# Patient Record
Sex: MALE | Race: WHITE | NOT HISPANIC OR LATINO
[De-identification: names, ages, dates, MRNs, and addresses within clinical notes are randomized per-mention and may not be internally consistent; named-entity substitution may affect disease eponyms.]

---
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
## 2024-01-17 ENCOUNTER — TRANSCRIPTION ENCOUNTER (OUTPATIENT)
Age: 19
End: 2024-01-17

## 2024-01-17 RX ORDER — INSULIN LISPRO 100 [IU]/ML
100 INJECTION, SOLUTION INTRAVENOUS; SUBCUTANEOUS
Qty: 9 | Refills: 2 | Status: ACTIVE | COMMUNITY
Start: 2023-01-03 | End: 1900-01-01

## 2024-02-09 ENCOUNTER — NON-APPOINTMENT (OUTPATIENT)
Age: 19
End: 2024-02-09

## 2024-04-01 ENCOUNTER — APPOINTMENT (OUTPATIENT)
Dept: ENDOCRINOLOGY | Facility: CLINIC | Age: 19
End: 2024-04-01
Payer: COMMERCIAL

## 2024-04-01 PROCEDURE — 99214 OFFICE O/P EST MOD 30 MIN: CPT

## 2024-04-01 RX ORDER — BLOOD-GLUCOSE SENSOR
EACH MISCELLANEOUS
Qty: 4 | Refills: 11 | Status: ACTIVE | COMMUNITY
Start: 2023-03-17 | End: 1900-01-01

## 2024-04-01 NOTE — REASON FOR VISIT
[Follow - Up] : a follow-up visit [Home] : at home, [unfilled] , at the time of the visit. [Medical Office: ([facility])___] : at the medical office located in  [Verbal consent obtained from patient] : the patient, [unfilled]

## 2024-06-07 RX ORDER — INSULIN LISPRO 100 [IU]/ML
100 INJECTION, SOLUTION SUBCUTANEOUS
Qty: 45 | Refills: 3 | Status: ACTIVE | COMMUNITY
Start: 2022-06-16 | End: 1900-01-01

## 2024-06-10 ENCOUNTER — RX RENEWAL (OUTPATIENT)
Age: 19
End: 2024-06-10

## 2024-06-10 RX ORDER — INSULIN GLARGINE 100 [IU]/ML
100 INJECTION, SOLUTION SUBCUTANEOUS
Qty: 15 | Refills: 11 | Status: ACTIVE | COMMUNITY
Start: 2022-06-14 | End: 1900-01-01

## 2024-06-14 ENCOUNTER — APPOINTMENT (OUTPATIENT)
Dept: ENDOCRINOLOGY | Facility: CLINIC | Age: 19
End: 2024-06-14
Payer: COMMERCIAL

## 2024-06-14 DIAGNOSIS — E10.9 TYPE 1 DIABETES MELLITUS W/OUT COMPLICATIONS: ICD-10-CM

## 2024-06-14 PROCEDURE — 99214 OFFICE O/P EST MOD 30 MIN: CPT

## 2024-06-14 NOTE — REASON FOR VISIT
[Follow - Up] : a follow-up visit [Home] : at home, [unfilled] , at the time of the visit. [Medical Office: ([facility])___] : at the medical office located in  [Verbal consent obtained from patient] : the patient, [unfilled]

## 2024-06-14 NOTE — HISTORY OF PRESENT ILLNESS
[FreeTextEntry1] : felt lantus 6 qhs wasnt working for past 2 weeks humalog judy kwikpen   was having fatty meat but cholesterol high so lean meats now   humalog 1:14 >200 then takes extra 2-3 units  151-200 201-250 251-300  lamictal still   PLan: inc lantus to 7 then 8 if needed due to overnight highs cont icr 1:14, if post meal lows then icr 1:15  no random corrections, add 1:50 >150.

## 2024-06-16 NOTE — PHYSICAL EXAM
[Alert] : alert [No Acute Distress] : no acute distress [Normal Voice/Communication] : normal voice communication

## 2024-07-12 ENCOUNTER — APPOINTMENT (OUTPATIENT)
Dept: ENDOCRINOLOGY | Facility: CLINIC | Age: 19
End: 2024-07-12

## 2024-08-12 ENCOUNTER — APPOINTMENT (OUTPATIENT)
Dept: ENDOCRINOLOGY | Facility: CLINIC | Age: 19
End: 2024-08-12
Payer: COMMERCIAL

## 2024-08-12 DIAGNOSIS — E10.9 TYPE 1 DIABETES MELLITUS W/OUT COMPLICATIONS: ICD-10-CM

## 2024-08-12 DIAGNOSIS — R73.09 OTHER ABNORMAL GLUCOSE: ICD-10-CM

## 2024-08-12 DIAGNOSIS — G40.909 EPILEPSY, UNSPECIFIED, NOT INTRACTABLE, W/OUT STATUS EPILEPTICUS: ICD-10-CM

## 2024-08-12 PROCEDURE — 99215 OFFICE O/P EST HI 40 MIN: CPT

## 2024-11-27 ENCOUNTER — APPOINTMENT (OUTPATIENT)
Dept: ENDOCRINOLOGY | Facility: CLINIC | Age: 19
End: 2024-11-27

## 2024-11-27 DIAGNOSIS — E10.9 TYPE 1 DIABETES MELLITUS W/OUT COMPLICATIONS: ICD-10-CM

## 2024-12-20 ENCOUNTER — LABORATORY RESULT (OUTPATIENT)
Age: 19
End: 2024-12-20

## 2024-12-20 ENCOUNTER — RESULT CHARGE (OUTPATIENT)
Age: 19
End: 2024-12-20

## 2024-12-20 ENCOUNTER — APPOINTMENT (OUTPATIENT)
Dept: ENDOCRINOLOGY | Facility: CLINIC | Age: 19
End: 2024-12-20
Payer: COMMERCIAL

## 2024-12-20 VITALS
DIASTOLIC BLOOD PRESSURE: 70 MMHG | OXYGEN SATURATION: 98 % | WEIGHT: 156 LBS | HEIGHT: 71 IN | HEART RATE: 70 BPM | BODY MASS INDEX: 21.84 KG/M2 | SYSTOLIC BLOOD PRESSURE: 116 MMHG

## 2024-12-20 DIAGNOSIS — G40.909 EPILEPSY, UNSPECIFIED, NOT INTRACTABLE, W/OUT STATUS EPILEPTICUS: ICD-10-CM

## 2024-12-20 LAB — HBA1C MFR BLD HPLC: 7.8

## 2024-12-20 PROCEDURE — 99215 OFFICE O/P EST HI 40 MIN: CPT

## 2024-12-20 PROCEDURE — 95251 CONT GLUC MNTR ANALYSIS I&R: CPT

## 2024-12-21 ENCOUNTER — EMERGENCY (EMERGENCY)
Facility: HOSPITAL | Age: 19
LOS: 1 days | Discharge: HOME | End: 2024-12-21
Attending: STUDENT IN AN ORGANIZED HEALTH CARE EDUCATION/TRAINING PROGRAM
Payer: COMMERCIAL

## 2024-12-21 VITALS
RESPIRATION RATE: 18 BRPM | HEART RATE: 94 BPM | HEIGHT: 71 IN | WEIGHT: 158.07 LBS | OXYGEN SATURATION: 95 % | DIASTOLIC BLOOD PRESSURE: 88 MMHG | TEMPERATURE: 99 F | SYSTOLIC BLOOD PRESSURE: 132 MMHG

## 2024-12-21 VITALS
HEART RATE: 86 BPM | TEMPERATURE: 98 F | OXYGEN SATURATION: 97 % | DIASTOLIC BLOOD PRESSURE: 73 MMHG | RESPIRATION RATE: 18 BRPM | SYSTOLIC BLOOD PRESSURE: 134 MMHG

## 2024-12-21 DIAGNOSIS — E10.65 TYPE 1 DIABETES MELLITUS WITH HYPERGLYCEMIA: ICD-10-CM

## 2024-12-21 LAB
ALBUMIN SERPL ELPH-MCNC: 5.8 G/DL — HIGH (ref 3.3–5)
ALP SERPL-CCNC: 89 U/L — SIGNIFICANT CHANGE UP (ref 40–120)
ALT FLD-CCNC: 18 U/L — SIGNIFICANT CHANGE UP (ref 10–45)
ANION GAP SERPL CALC-SCNC: 18 MMOL/L — HIGH (ref 5–17)
APPEARANCE UR: CLEAR — SIGNIFICANT CHANGE UP
AST SERPL-CCNC: 19 U/L — SIGNIFICANT CHANGE UP (ref 10–40)
B-OH-BUTYR SERPL-SCNC: 0.4 MMOL/L — SIGNIFICANT CHANGE UP
BACTERIA # UR AUTO: NEGATIVE /HPF — SIGNIFICANT CHANGE UP
BASOPHILS # BLD AUTO: 0.03 K/UL — SIGNIFICANT CHANGE UP (ref 0–0.2)
BASOPHILS NFR BLD AUTO: 0.5 % — SIGNIFICANT CHANGE UP (ref 0–2)
BILIRUB SERPL-MCNC: 0.4 MG/DL — SIGNIFICANT CHANGE UP (ref 0.2–1.2)
BILIRUB UR-MCNC: NEGATIVE — SIGNIFICANT CHANGE UP
BUN SERPL-MCNC: 9 MG/DL — SIGNIFICANT CHANGE UP (ref 7–23)
CALCIUM SERPL-MCNC: 10.2 MG/DL — SIGNIFICANT CHANGE UP (ref 8.4–10.5)
CAST: 0 /LPF — SIGNIFICANT CHANGE UP (ref 0–4)
CHLORIDE SERPL-SCNC: 101 MMOL/L — SIGNIFICANT CHANGE UP (ref 96–108)
CO2 SERPL-SCNC: 21 MMOL/L — LOW (ref 22–31)
COLOR SPEC: YELLOW — SIGNIFICANT CHANGE UP
CREAT SERPL-MCNC: 0.81 MG/DL — SIGNIFICANT CHANGE UP (ref 0.5–1.3)
DIFF PNL FLD: NEGATIVE — SIGNIFICANT CHANGE UP
EGFR: 129 ML/MIN/1.73M2 — SIGNIFICANT CHANGE UP
EOSINOPHIL # BLD AUTO: 0 K/UL — SIGNIFICANT CHANGE UP (ref 0–0.5)
EOSINOPHIL NFR BLD AUTO: 0 % — SIGNIFICANT CHANGE UP (ref 0–6)
GAS PNL BLDV: SIGNIFICANT CHANGE UP
GLUCOSE SERPL-MCNC: 158 MG/DL — HIGH (ref 70–99)
GLUCOSE UR QL: 100 MG/DL
HCT VFR BLD CALC: 45.6 % — SIGNIFICANT CHANGE UP (ref 39–50)
HGB BLD-MCNC: 16 G/DL — SIGNIFICANT CHANGE UP (ref 13–17)
IMM GRANULOCYTES NFR BLD AUTO: 0.3 % — SIGNIFICANT CHANGE UP (ref 0–0.9)
KETONES UR-MCNC: NEGATIVE MG/DL — SIGNIFICANT CHANGE UP
LEUKOCYTE ESTERASE UR-ACNC: NEGATIVE — SIGNIFICANT CHANGE UP
LYMPHOCYTES # BLD AUTO: 0.83 K/UL — LOW (ref 1–3.3)
LYMPHOCYTES # BLD AUTO: 12.8 % — LOW (ref 13–44)
MCHC RBC-ENTMCNC: 30.2 PG — SIGNIFICANT CHANGE UP (ref 27–34)
MCHC RBC-ENTMCNC: 35.1 G/DL — SIGNIFICANT CHANGE UP (ref 32–36)
MCV RBC AUTO: 86.2 FL — SIGNIFICANT CHANGE UP (ref 80–100)
MONOCYTES # BLD AUTO: 0.46 K/UL — SIGNIFICANT CHANGE UP (ref 0–0.9)
MONOCYTES NFR BLD AUTO: 7.1 % — SIGNIFICANT CHANGE UP (ref 2–14)
NEUTROPHILS # BLD AUTO: 5.12 K/UL — SIGNIFICANT CHANGE UP (ref 1.8–7.4)
NEUTROPHILS NFR BLD AUTO: 79.3 % — HIGH (ref 43–77)
NITRITE UR-MCNC: NEGATIVE — SIGNIFICANT CHANGE UP
NRBC # BLD: 0 /100 WBCS — SIGNIFICANT CHANGE UP (ref 0–0)
PH UR: 6 — SIGNIFICANT CHANGE UP (ref 5–8)
PLATELET # BLD AUTO: 211 K/UL — SIGNIFICANT CHANGE UP (ref 150–400)
POTASSIUM SERPL-MCNC: 4.4 MMOL/L — SIGNIFICANT CHANGE UP (ref 3.5–5.3)
POTASSIUM SERPL-SCNC: 4.4 MMOL/L — SIGNIFICANT CHANGE UP (ref 3.5–5.3)
PROT SERPL-MCNC: 8 G/DL — SIGNIFICANT CHANGE UP (ref 6–8.3)
PROT UR-MCNC: NEGATIVE MG/DL — SIGNIFICANT CHANGE UP
RBC # BLD: 5.29 M/UL — SIGNIFICANT CHANGE UP (ref 4.2–5.8)
RBC # FLD: 11.9 % — SIGNIFICANT CHANGE UP (ref 10.3–14.5)
RBC CASTS # UR COMP ASSIST: 0 /HPF — SIGNIFICANT CHANGE UP (ref 0–4)
SALICYLATES SERPL-MCNC: <2 MG/DL — LOW (ref 15–30)
SODIUM SERPL-SCNC: 140 MMOL/L — SIGNIFICANT CHANGE UP (ref 135–145)
SP GR SPEC: 1.01 — SIGNIFICANT CHANGE UP (ref 1–1.03)
SQUAMOUS # UR AUTO: 0 /HPF — SIGNIFICANT CHANGE UP (ref 0–5)
UROBILINOGEN FLD QL: 0.2 MG/DL — SIGNIFICANT CHANGE UP (ref 0.2–1)
WBC # BLD: 6.46 K/UL — SIGNIFICANT CHANGE UP (ref 3.8–10.5)
WBC # FLD AUTO: 6.46 K/UL — SIGNIFICANT CHANGE UP (ref 3.8–10.5)
WBC UR QL: 0 /HPF — SIGNIFICANT CHANGE UP (ref 0–5)

## 2024-12-21 PROCEDURE — 84295 ASSAY OF SERUM SODIUM: CPT

## 2024-12-21 PROCEDURE — 36000 PLACE NEEDLE IN VEIN: CPT

## 2024-12-21 PROCEDURE — 82803 BLOOD GASES ANY COMBINATION: CPT

## 2024-12-21 PROCEDURE — 87086 URINE CULTURE/COLONY COUNT: CPT

## 2024-12-21 PROCEDURE — 83605 ASSAY OF LACTIC ACID: CPT

## 2024-12-21 PROCEDURE — 80307 DRUG TEST PRSMV CHEM ANLYZR: CPT

## 2024-12-21 PROCEDURE — 80175 DRUG SCREEN QUAN LAMOTRIGINE: CPT

## 2024-12-21 PROCEDURE — 85025 COMPLETE CBC W/AUTO DIFF WBC: CPT

## 2024-12-21 PROCEDURE — 82947 ASSAY GLUCOSE BLOOD QUANT: CPT

## 2024-12-21 PROCEDURE — 82435 ASSAY OF BLOOD CHLORIDE: CPT

## 2024-12-21 PROCEDURE — 83735 ASSAY OF MAGNESIUM: CPT

## 2024-12-21 PROCEDURE — 99284 EMERGENCY DEPT VISIT MOD MDM: CPT

## 2024-12-21 PROCEDURE — 70450 CT HEAD/BRAIN W/O DYE: CPT | Mod: 26,MC

## 2024-12-21 PROCEDURE — 85014 HEMATOCRIT: CPT

## 2024-12-21 PROCEDURE — 99284 EMERGENCY DEPT VISIT MOD MDM: CPT | Mod: 25

## 2024-12-21 PROCEDURE — 70450 CT HEAD/BRAIN W/O DYE: CPT | Mod: MC

## 2024-12-21 PROCEDURE — 82010 KETONE BODYS QUAN: CPT

## 2024-12-21 PROCEDURE — 85018 HEMOGLOBIN: CPT

## 2024-12-21 PROCEDURE — 84100 ASSAY OF PHOSPHORUS: CPT

## 2024-12-21 PROCEDURE — 82330 ASSAY OF CALCIUM: CPT

## 2024-12-21 PROCEDURE — 81001 URINALYSIS AUTO W/SCOPE: CPT

## 2024-12-21 PROCEDURE — 80053 COMPREHEN METABOLIC PANEL: CPT

## 2024-12-21 PROCEDURE — 84132 ASSAY OF SERUM POTASSIUM: CPT

## 2024-12-21 PROCEDURE — 82962 GLUCOSE BLOOD TEST: CPT

## 2024-12-21 RX ORDER — 0.9 % SODIUM CHLORIDE 0.9 %
1000 INTRAVENOUS SOLUTION INTRAVENOUS ONCE
Refills: 0 | Status: COMPLETED | OUTPATIENT
Start: 2024-12-21 | End: 2024-12-21

## 2024-12-21 RX ADMIN — Medication 1000 MILLILITER(S): at 16:19

## 2024-12-21 NOTE — ED PROVIDER NOTE - PHYSICAL EXAMINATION
GENERAL: no acute distress, non-toxic appearing  HEENT: normal conjunctiva, oral mucosa dry  CARDIAC: regular rate and regular rhythm  PULM: clear to ascultation bilaterally, no appreciable crackles, rales, rhonchi, or wheezing, no increased work of breathing  GI: abdomen nondistended, soft, nontender  : no CVA tenderness, no suprapubic tenderness

## 2024-12-21 NOTE — ED PROVIDER NOTE - PATIENT PORTAL LINK FT
You can access the FollowMyHealth Patient Portal offered by [facility] by registering at the following website: [email or web address]. By joining [employer]’s FollowMyHealth portal, you will also be able to view your health information using other applications (apps) compatible with our system.

## 2024-12-21 NOTE — ED ADULT NURSE NOTE - OBJECTIVE STATEMENT
Pt is a 18 y/o male with PMH epilepsy and T1DM presenting to the ED c/o intermittent abd pain with n/v, and 16 lb weight loss over the past 1-2 months. Pt reports pain, n/v not currently present. Pt states vomiting is non-bloody and typically occurs in the morning, abd pain when present is above the umbilicus. Pt reports he is able to tolerate PO intake on daily basis, but eating less due to "bad food at college." Pt was seen by endocrinologist with elevated anion gap yesterday. Pt states last seizure in Feb 2024. Pt denies polyuria, polydipsia, headaches, sob, chest pain.

## 2024-12-21 NOTE — ED PROVIDER NOTE - PROGRESS NOTE DETAILS
CT without evidence of intracranial pathology. No evidence of DKA at this time on blood work. Pt recently found to have normal thyroid functioning on outpatient labs. Will send pt home with GI follow up for potential gastritis. Pt is afebrile currently, vitals are stable. Pt was educated on outpatient treatment, follow up and return precautions. Shared decision making performed. Pt okay for DC home at this time. Questions answered. Pt understands and agrees with the plan for discharge home. Pt has access to appropriate follow up.   Patsy Smith PGY1

## 2024-12-21 NOTE — ED PROVIDER NOTE - CLINICAL SUMMARY MEDICAL DECISION MAKING FREE TEXT BOX
20 y/o M PMHx DM type 1 and epilepsy on lamotrigine presenting with concern for DKA. Pt was seen by endocrinologist on 12/20 and blood work found that he was acidotic and ketones in urine. Pt has been vomiting within the first 15 minutes of waking up for the past 1.5 - 2 months. Low suspicion for DKA given length of symptoms and BS. Concern for increased ICP/intracranial mass given vomiting first thing in the morning and recent weight loss. Will give fluids, obtain cbc, cmp, beta-hydroxybutrate, UA, vbg. Will obtain CT head. Dispo pending labs and imaging.

## 2024-12-21 NOTE — ED PROVIDER NOTE - ATTENDING CONTRIBUTION TO CARE
Kalani Soto (Rodriguez), DO  I have personally performed a face to face medical and diagnostic evaluation of the patient. I have discussed with and reviewed the resident's note and agree with the History, ROS, Physical Exam and MDM unless otherwise indicated. A brief summary of my personal evaluation and impression can be found below. I actively participated in the comanagement of this patient with the resident. I have personally reviewed all orders, study/imaging results, medication orders. I discussed indications for consultant evaluation and consultant recommendations with the resident when applicable, and have discussed the disposition plan with the resident.  19y male hx DM1 on lantus and humalog only presents for evaluation of outpatient elevated anion gap and ketoacids. pt has noted 16lb unintentional weight loss and vomiting in the mornings nearly every other day. he does not usually vomit throughout the day. no associated fever chills or night sweats. no urinary symptoms. states that the mornings he vomits, he usually has a meal late in the evening the night before. denies cp sob syncope. drinks alcohol on occasion, but not usually before the vomiting. no marijuana or other drug use. recently tested for thyroid dysfunction outpatient, which was normal. also being worked up outpatient for addisons disease. no travel or sick contacts. consider severe gastritis v pancreatitis. unlikely chronically in DKA. BGL only 207 on arrival and pt overall well appearing. normal BP no concerns for adrenal crisis. clinically with dry mucus membranes, clear lungs, benign abdomen. never had a CT or MRI for his epilepsy, so will rule out space occupying lesion in the setting or morning HA/vomiting. pt neuro intact. possible side effect of recently increased lamotrigine, will send a level. otherwise if labs and CT nonactionable pt appears well enough for outpatient follow up.

## 2024-12-21 NOTE — ED PROVIDER NOTE - NSFOLLOWUPINSTRUCTIONS_ED_ALL_ED_FT
Thank you for coming to the Emergency Department.    You were seen today for concern for diabetic ketoacidosis (DKA) and history of vomiting for the past couple of months. We obtained lab work and imaging to help determine what was causing your symptoms. You were not found to be in DKA at this time. You CT scan of your brain did not show any signs of intracranial masses or bleeding at this time.     Based on our examination we have determined that there is no immediate danger to your health at this time.    We would like you to follow up with your primary care doctor within 1 week.     PLEASE RETURN TO THE EMERGENCY DEPARTMENT and call 911 IF YOU EXPERIENCE ANY OF THE FOLLOWING SYMPTOMS: inability to tolerate food and drink, fever, abdominal pain, relentless vomiting or any other concerning symptoms.

## 2024-12-21 NOTE — ED PROVIDER NOTE - OBJECTIVE STATEMENT
20 y/o M PMHx DM type 1 and epilepsy on lamotrigine presenting with 1.5 - 18 y/o M PMHx DM type 1 and epilepsy on lamotrigine presenting with concern for DKA. Pt was seen by endocrinologist on 12/20 and blood work found that he was acidotic and ketones in urine. Pt has been vomiting within the first 15 minutes of waking up for the past 1.5 - 2 months. Pt denies polyuria, polydispsia, dysuria, abdominal pain, nausea, diarrhea, fever, recent falls/trauma, chest pain, recent illnesses, HA, changes in vision. Pt compliant with all medications. Pt endorses recent stress at school over the last 2 months but denies SI, HI, previous suicide attempts. pt on lexapro, with good support system. Pt endorsing recreational drinking and marijuana use every 2-3 weeks. Pt lost 16 lbs in the last 1.5 months

## 2024-12-22 LAB
CULTURE RESULTS: SIGNIFICANT CHANGE UP
SPECIMEN SOURCE: SIGNIFICANT CHANGE UP

## 2024-12-23 DIAGNOSIS — E10.9 TYPE 1 DIABETES MELLITUS W/OUT COMPLICATIONS: ICD-10-CM

## 2024-12-23 LAB
ANION GAP SERPL CALC-SCNC: 18 MMOL/L
BUN SERPL-MCNC: 14 MG/DL
C PEPTIDE SERPL-MCNC: 0.8 NG/ML
CALCIUM SERPL-MCNC: 10.2 MG/DL
CHLORIDE SERPL-SCNC: 100 MMOL/L
CO2 SERPL-SCNC: 21 MMOL/L
CREAT SERPL-MCNC: 0.83 MG/DL
EGFR: 128 ML/MIN/1.73M2
GLUCOSE SERPL-MCNC: 209 MG/DL
HCT VFR BLD CALC: 46.3 %
HGB BLD-MCNC: 16.4 G/DL
MCHC RBC-ENTMCNC: 30.4 PG
MCHC RBC-ENTMCNC: 35.4 G/DL
MCV RBC AUTO: 85.7 FL
PLATELET # BLD AUTO: 212 K/UL
POTASSIUM SERPL-SCNC: 4.7 MMOL/L
RBC # BLD: 5.4 M/UL
RBC # FLD: 12.5 %
SODIUM SERPL-SCNC: 139 MMOL/L
TSH SERPL-ACNC: 1.36 UIU/ML
WBC # FLD AUTO: 4.06 K/UL

## 2024-12-24 LAB — LAMOTRIGINE SERPL-MCNC: <1 UG/ML — LOW (ref 2–20)

## 2024-12-27 LAB — CELIACPAN: NORMAL

## 2024-12-28 LAB — GAD65 AB SER-MCNC: 0.19 NMOL/L

## 2025-04-18 ENCOUNTER — APPOINTMENT (OUTPATIENT)
Dept: ENDOCRINOLOGY | Facility: CLINIC | Age: 20
End: 2025-04-18

## 2025-04-18 ENCOUNTER — TRANSCRIPTION ENCOUNTER (OUTPATIENT)
Age: 20
End: 2025-04-18

## 2025-05-16 ENCOUNTER — APPOINTMENT (INPATIENT)
Dept: RADIOLOGY | Facility: HOSPITAL | Age: 20
DRG: 637 | End: 2025-05-16
Attending: HOSPITALIST
Payer: COMMERCIAL

## 2025-05-16 ENCOUNTER — APPOINTMENT (INPATIENT)
Dept: RADIOLOGY | Facility: HOSPITAL | Age: 20
DRG: 637 | End: 2025-05-16
Attending: SURGERY
Payer: COMMERCIAL

## 2025-05-16 ENCOUNTER — HOSPITAL ENCOUNTER (INPATIENT)
Facility: HOSPITAL | Age: 20
LOS: 3 days | Discharge: HOME | DRG: 637 | End: 2025-05-19
Admitting: HOSPITALIST
Payer: COMMERCIAL

## 2025-05-16 ENCOUNTER — APPOINTMENT (EMERGENCY)
Dept: RADIOLOGY | Facility: HOSPITAL | Age: 20
DRG: 637 | End: 2025-05-16
Payer: COMMERCIAL

## 2025-05-16 DIAGNOSIS — E10.10 DIABETIC KETOACIDOSIS WITHOUT COMA ASSOCIATED WITH TYPE 1 DIABETES MELLITUS (CMS/HCC): Primary | ICD-10-CM

## 2025-05-16 PROBLEM — D72.829 LEUKOCYTOSIS: Status: ACTIVE | Noted: 2025-05-16

## 2025-05-16 PROBLEM — J98.2 PNEUMOMEDIASTINUM (CMS/HCC): Status: ACTIVE | Noted: 2025-05-16

## 2025-05-16 PROBLEM — E87.20 METABOLIC ACIDOSIS: Status: ACTIVE | Noted: 2025-05-16

## 2025-05-16 PROBLEM — J69.0 ASPIRATION PNEUMONIA (CMS/HCC): Status: ACTIVE | Noted: 2025-05-16

## 2025-05-16 PROBLEM — E87.1 HYPONATREMIA: Status: ACTIVE | Noted: 2025-05-16

## 2025-05-16 LAB
ALBUMIN SERPL-MCNC: 5.1 G/DL (ref 3.5–5.7)
ALP SERPL-CCNC: 106 IU/L (ref 34–125)
ALT SERPL-CCNC: 19 IU/L (ref 7–52)
ANION GAP SERPL CALC-SCNC: 13 MEQ/L (ref 3–15)
ANION GAP SERPL CALC-SCNC: 14 MEQ/L (ref 3–15)
ANION GAP SERPL CALC-SCNC: 16 MEQ/L (ref 3–15)
ANION GAP SERPL CALC-SCNC: 22 MEQ/L (ref 3–15)
ANION GAP SERPL CALC-SCNC: 28 MEQ/L (ref 3–15)
AST SERPL-CCNC: 16 IU/L (ref 13–39)
ATRIAL RATE: 113
B-OH-BUTYR SERPL-SCNC: 8.58 MMOL/L
B-OH-BUTYR SERPL-SCNC: 8.94 MMOL/L
BASE EXCESS BLDV CALC-SCNC: -11.1 MEQ/L
BASE EXCESS BLDV CALC-SCNC: -14.2 MEQ/L
BASE EXCESS BLDV CALC-SCNC: -19.6 MEQ/L
BASE EXCESS BLDV CALC-SCNC: -5.2 MEQ/L
BASE EXCESS BLDV CALC-SCNC: -7.3 MEQ/L
BASOPHILS # BLD: 0 K/UL (ref 0.01–0.1)
BASOPHILS NFR BLD: 0 %
BILIRUB SERPL-MCNC: 0.5 MG/DL (ref 0.3–1.2)
BUN SERPL-MCNC: 10 MG/DL (ref 7–25)
BUN SERPL-MCNC: 6 MG/DL (ref 7–25)
BUN SERPL-MCNC: 6 MG/DL (ref 7–25)
BUN SERPL-MCNC: 7 MG/DL (ref 7–25)
BUN SERPL-MCNC: 9 MG/DL (ref 7–25)
CA-I BLD-SCNC: 1.26 MMOL/L (ref 1.15–1.27)
CA-I BLDV-SCNC: 1.27 MMOL/L (ref 1.15–1.27)
CA-I BLDV-SCNC: 1.29 MMOL/L (ref 1.15–1.27)
CA-I BLDV-SCNC: 1.29 MMOL/L (ref 1.15–1.27)
CA-I BLDV-SCNC: 1.3 MMOL/L (ref 1.15–1.27)
CALCIUM SERPL-MCNC: 10.8 MG/DL (ref 8.6–10.3)
CALCIUM SERPL-MCNC: 9.3 MG/DL (ref 8.6–10.3)
CALCIUM SERPL-MCNC: 9.6 MG/DL (ref 8.6–10.3)
CALCIUM SERPL-MCNC: 9.8 MG/DL (ref 8.6–10.3)
CALCIUM SERPL-MCNC: 9.9 MG/DL (ref 8.6–10.3)
CHLORIDE BLDV-SCNC: 100 MEQ/L (ref 98–109)
CHLORIDE BLDV-SCNC: 101 MEQ/L (ref 98–109)
CHLORIDE BLDV-SCNC: 101 MEQ/L (ref 98–109)
CHLORIDE BLDV-SCNC: 104 MEQ/L (ref 98–109)
CHLORIDE BLDV-SCNC: 97 MEQ/L (ref 98–109)
CHLORIDE SERPL-SCNC: 101 MEQ/L (ref 98–107)
CHLORIDE SERPL-SCNC: 103 MEQ/L (ref 98–107)
CHLORIDE SERPL-SCNC: 103 MEQ/L (ref 98–107)
CHLORIDE SERPL-SCNC: 105 MEQ/L (ref 98–107)
CHLORIDE SERPL-SCNC: 95 MEQ/L (ref 98–107)
CO2 BLDV-SCNC: 12 MEQ/L (ref 22–32)
CO2 BLDV-SCNC: 15 MEQ/L (ref 22–32)
CO2 BLDV-SCNC: 18 MEQ/L (ref 22–32)
CO2 BLDV-SCNC: 22 MEQ/L (ref 22–32)
CO2 BLDV-SCNC: 8.4 MEQ/L (ref 22–32)
CO2 SERPL-SCNC: 11 MEQ/L (ref 21–31)
CO2 SERPL-SCNC: 14 MEQ/L (ref 21–31)
CO2 SERPL-SCNC: 16 MEQ/L (ref 21–31)
CO2 SERPL-SCNC: 19 MEQ/L (ref 21–31)
CO2 SERPL-SCNC: 9 MEQ/L (ref 21–31)
COHGB MFR BLD: 1.3 %
CREAT SERPL-MCNC: 0.8 MG/DL (ref 0.7–1.3)
CREAT SERPL-MCNC: 0.9 MG/DL (ref 0.7–1.3)
CREAT SERPL-MCNC: 0.9 MG/DL (ref 0.7–1.3)
CREAT SERPL-MCNC: 1 MG/DL (ref 0.7–1.3)
CREAT SERPL-MCNC: 1.2 MG/DL (ref 0.7–1.3)
DIFFERENTIAL METHOD BLD: ABNORMAL
EGFRCR SERPLBLD CKD-EPI 2021: >60 ML/MIN/1.73M*2
EOSINOPHIL # BLD: 0.46 K/UL (ref 0.04–0.54)
EOSINOPHIL NFR BLD: 2 %
ERYTHROCYTE [DISTWIDTH] IN BLOOD BY AUTOMATED COUNT: 12 % (ref 11.6–14.4)
EST. AVERAGE GLUCOSE BLD GHB EST-MCNC: 192 MG/DL
FIO2 ON VENT: ABNORMAL %
GLUCOSE BLD-MCNC: 128 MG/DL (ref 70–99)
GLUCOSE BLD-MCNC: 142 MG/DL (ref 70–99)
GLUCOSE BLD-MCNC: 155 MG/DL (ref 70–99)
GLUCOSE BLD-MCNC: 160 MG/DL (ref 70–99)
GLUCOSE BLD-MCNC: 171 MG/DL (ref 70–99)
GLUCOSE BLD-MCNC: 177 MG/DL (ref 70–99)
GLUCOSE BLD-MCNC: 180 MG/DL (ref 70–99)
GLUCOSE BLD-MCNC: 180 MG/DL (ref 70–99)
GLUCOSE BLD-MCNC: 185 MG/DL (ref 70–99)
GLUCOSE BLD-MCNC: 192 MG/DL (ref 70–99)
GLUCOSE BLD-MCNC: 193 MG/DL (ref 70–99)
GLUCOSE BLD-MCNC: 195 MG/DL (ref 70–99)
GLUCOSE BLD-MCNC: 197 MG/DL (ref 70–99)
GLUCOSE BLD-MCNC: 198 MG/DL (ref 70–99)
GLUCOSE BLD-MCNC: 201 MG/DL (ref 70–99)
GLUCOSE BLD-MCNC: 201 MG/DL (ref 70–99)
GLUCOSE BLD-MCNC: 208 MG/DL (ref 70–99)
GLUCOSE BLD-MCNC: 222 MG/DL (ref 70–99)
GLUCOSE BLD-MCNC: 294 MG/DL (ref 70–99)
GLUCOSE BLD-MCNC: 301 MG/DL (ref 70–99)
GLUCOSE BLDV-MCNC: 159 MG/DL (ref 70–99)
GLUCOSE BLDV-MCNC: 195 MG/DL (ref 70–99)
GLUCOSE BLDV-MCNC: 223 MG/DL (ref 70–99)
GLUCOSE BLDV-MCNC: 226 MG/DL (ref 70–99)
GLUCOSE BLDV-MCNC: 386 MG/DL (ref 70–99)
GLUCOSE SERPL-MCNC: 156 MG/DL (ref 70–99)
GLUCOSE SERPL-MCNC: 180 MG/DL (ref 70–99)
GLUCOSE SERPL-MCNC: 217 MG/DL (ref 70–99)
GLUCOSE SERPL-MCNC: 218 MG/DL (ref 70–99)
GLUCOSE SERPL-MCNC: 326 MG/DL (ref 70–99)
HBA1C MFR BLD: 8.3 %
HCO3 BLDV-SCNC: 13 MEQ/L (ref 21–28)
HCO3 BLDV-SCNC: 16 MEQ/L (ref 21–28)
HCO3 BLDV-SCNC: 19 MEQ/L (ref 21–28)
HCO3 BLDV-SCNC: 19 MEQ/L (ref 21–28)
HCO3 BLDV-SCNC: 9 MEQ/L (ref 21–28)
HCT VFR BLD AUTO: 47.1 % (ref 40.1–51)
HGB BLD-MCNC: 16.4 G/DL (ref 13.7–17.5)
HGB BLDV-MCNC: 14.8 G/DL (ref 14–17.5)
HGB BLDV-MCNC: 15.5 G/DL (ref 14–17.5)
HGB BLDV-MCNC: 15.9 G/DL (ref 14–17.5)
HGB BLDV-MCNC: 16.5 G/DL (ref 14–17.5)
HGB BLDV-MCNC: 17.3 G/DL (ref 14–17.5)
INHALED O2 CONCENTRATION: ABNORMAL %
LACTATE BLDV-SCNC: 0.7 MMOL/L (ref 0.4–2)
LACTATE BLDV-SCNC: 0.7 MMOL/L (ref 0.4–2)
LACTATE BLDV-SCNC: 0.9 MMOL/L (ref 0.4–2)
LACTATE BLDV-SCNC: 1.3 MMOL/L (ref 0.4–2)
LACTATE SERPL-SCNC: 1.3 MMOL/L (ref 0.4–2)
LACTATE SERPL-SCNC: 2.4 MMOL/L (ref 0.4–2)
LACTATE SERPL-SCNC: 2.4 MMOL/L (ref 0.4–2)
LIPASE SERPL-CCNC: 7 U/L (ref 11–82)
LYMPHOCYTES # BLD: 1.61 K/UL (ref 1.2–3.5)
LYMPHOCYTES NFR BLD: 7 %
MAGNESIUM SERPL-MCNC: 1.8 MG/DL (ref 1.8–2.5)
MAGNESIUM SERPL-MCNC: 1.8 MG/DL (ref 1.8–2.5)
MAGNESIUM SERPL-MCNC: 2 MG/DL (ref 1.8–2.5)
MAGNESIUM SERPL-MCNC: 2.1 MG/DL (ref 1.8–2.5)
MAGNESIUM SERPL-MCNC: 2.4 MG/DL (ref 1.8–2.5)
MCH RBC QN AUTO: 30.3 PG (ref 28–33.2)
MCHC RBC AUTO-ENTMCNC: 34.8 G/DL (ref 32.2–36.5)
MCV RBC AUTO: 87.1 FL (ref 83–98)
METHGB BLD-SCNC: 0.9 % (ref 0.4–1.5)
MONOCYTES # BLD: 1.38 K/UL (ref 0.3–1)
MONOCYTES NFR BLD: 6 %
MRSA DNA SPEC QL NAA+PROBE: NOT DETECTED
NEUTS BAND # BLD: 19.58 K/UL (ref 1.7–7)
NEUTS SEG NFR BLD: 85 %
P AXIS: 73
PCO2 BLDV: 23 MM HG (ref 41–51)
PCO2 BLDV: 25 MM HG (ref 41–51)
PCO2 BLDV: 29 MM HG (ref 41–51)
PCO2 BLDV: 32 MM HG (ref 41–51)
PCO2 BLDV: 40 MM HG (ref 41–51)
PH BLDV: 7.13 [PH] (ref 7.32–7.42)
PH BLDV: 7.25 PH (ref 7.32–7.42)
PH BLDV: 7.29 PH (ref 7.32–7.42)
PH BLDV: 7.32 PH (ref 7.32–7.42)
PH BLDV: 7.34 PH (ref 7.32–7.42)
PHOSPHATE SERPL-MCNC: 3.1 MG/DL (ref 2.4–4.7)
PHOSPHATE SERPL-MCNC: 3.2 MG/DL (ref 2.4–4.7)
PHOSPHATE SERPL-MCNC: 3.3 MG/DL (ref 2.4–4.7)
PHOSPHATE SERPL-MCNC: 3.6 MG/DL (ref 2.4–4.7)
PLATELET # BLD AUTO: 365 K/UL (ref 150–350)
PLATELET # BLD EST: ABNORMAL 10*3/UL
PLATELET CLUMP BLD QL SMEAR: PRESENT
PMV BLD AUTO: 11.5 FL (ref 9.4–12.4)
PO2 BLDV: 27 MM HG (ref 25–40)
PO2 BLDV: 41 MM HG (ref 25–40)
PO2 BLDV: 47 MM HG (ref 25–40)
PO2 BLDV: 77 MM HG (ref 25–40)
PO2 BLDV: 78 MM HG (ref 25–40)
POCT PATIENT TEMPERATURE: 98.6 °F (ref 97–99)
POCT TEST (BLD GAS): ABNORMAL
POCT TEST: ABNORMAL
POTASSIUM BLDV-SCNC: 4.5 MEQ/L (ref 3.4–4.5)
POTASSIUM BLDV-SCNC: 4.6 MEQ/L (ref 3.4–4.5)
POTASSIUM BLDV-SCNC: 4.6 MEQ/L (ref 3.4–4.5)
POTASSIUM BLDV-SCNC: 5.2 MEQ/L (ref 3.4–4.5)
POTASSIUM BLDV-SCNC: 5.3 MEQ/L (ref 3.4–4.5)
POTASSIUM SERPL-SCNC: 4 MEQ/L (ref 3.5–5.1)
POTASSIUM SERPL-SCNC: 4.3 MEQ/L (ref 3.5–5.1)
POTASSIUM SERPL-SCNC: 4.6 MEQ/L (ref 3.5–5.1)
POTASSIUM SERPL-SCNC: 5 MEQ/L (ref 3.5–5.1)
POTASSIUM SERPL-SCNC: 5 MEQ/L (ref 3.5–5.1)
PR INTERVAL: 146
PROT SERPL-MCNC: 9.4 G/DL (ref 6–8.2)
QRS DURATION: 104
QT INTERVAL: 338
QTC CALCULATION(BAZETT): 463
R AXIS: 47
RBC # BLD AUTO: 5.41 M/UL (ref 4.5–5.8)
RBC MORPH BLD: NORMAL
SAO2 % BLDV: 47 % (ref 30–60)
SAO2 % BLDV: 73 % (ref 30–60)
SAO2 % BLDV: 80 % (ref 30–60)
SAO2 % BLDV: 95 % (ref 30–60)
SAO2 % BLDV: 96 % (ref 30–60)
SODIUM BLDV-SCNC: 127 MEQ/L (ref 136–145)
SODIUM BLDV-SCNC: 130 MEQ/L (ref 136–145)
SODIUM BLDV-SCNC: 130 MEQ/L (ref 136–145)
SODIUM BLDV-SCNC: 132 MEQ/L (ref 136–145)
SODIUM BLDV-SCNC: 133 MEQ/L (ref 136–145)
SODIUM SERPL-SCNC: 132 MEQ/L (ref 136–145)
SODIUM SERPL-SCNC: 133 MEQ/L (ref 136–145)
SODIUM SERPL-SCNC: 134 MEQ/L (ref 136–145)
SODIUM SERPL-SCNC: 135 MEQ/L (ref 136–145)
SODIUM SERPL-SCNC: 135 MEQ/L (ref 136–145)
T WAVE AXIS: 40
VENTRICULAR RATE: 113
WBC # BLD AUTO: 23.04 K/UL (ref 3.8–10.5)

## 2025-05-16 PROCEDURE — 36415 COLL VENOUS BLD VENIPUNCTURE: CPT

## 2025-05-16 PROCEDURE — 85025 COMPLETE CBC W/AUTO DIFF WBC: CPT

## 2025-05-16 PROCEDURE — 99222 1ST HOSP IP/OBS MODERATE 55: CPT | Performed by: SURGERY

## 2025-05-16 PROCEDURE — 20000000 HC ROOM AND CARE ICU

## 2025-05-16 PROCEDURE — 25000000 HC PHARMACY GENERAL

## 2025-05-16 PROCEDURE — 74176 CT ABD & PELVIS W/O CONTRAST: CPT

## 2025-05-16 PROCEDURE — 99291 CRITICAL CARE FIRST HOUR: CPT | Performed by: HOSPITALIST

## 2025-05-16 PROCEDURE — 83735 ASSAY OF MAGNESIUM: CPT

## 2025-05-16 PROCEDURE — 71045 X-RAY EXAM CHEST 1 VIEW: CPT

## 2025-05-16 PROCEDURE — 87641 MR-STAPH DNA AMP PROBE: CPT | Performed by: INTERNAL MEDICINE

## 2025-05-16 PROCEDURE — 63600000 HC DRUGS/DETAIL CODE: Mod: JZ

## 2025-05-16 PROCEDURE — 84100 ASSAY OF PHOSPHORUS: CPT

## 2025-05-16 PROCEDURE — 96374 THER/PROPH/DIAG INJ IV PUSH: CPT

## 2025-05-16 PROCEDURE — 93005 ELECTROCARDIOGRAM TRACING: CPT

## 2025-05-16 PROCEDURE — 80053 COMPREHEN METABOLIC PANEL: CPT

## 2025-05-16 PROCEDURE — 83605 ASSAY OF LACTIC ACID: CPT

## 2025-05-16 PROCEDURE — 80048 BASIC METABOLIC PNL TOTAL CA: CPT

## 2025-05-16 PROCEDURE — 99285 EMERGENCY DEPT VISIT HI MDM: CPT | Mod: 25

## 2025-05-16 PROCEDURE — 82010 KETONE BODYS QUAN: CPT

## 2025-05-16 PROCEDURE — 25800000 HC PHARMACY IV SOLUTIONS: Performed by: HOSPITALIST

## 2025-05-16 PROCEDURE — 25800000 HC PHARMACY IV SOLUTIONS

## 2025-05-16 PROCEDURE — 63600000 HC DRUGS/DETAIL CODE

## 2025-05-16 PROCEDURE — 63600105 HC IODINE BASED CONTRAST: Mod: JZ | Performed by: SURGERY

## 2025-05-16 PROCEDURE — 74220 X-RAY XM ESOPHAGUS 1CNTRST: CPT

## 2025-05-16 PROCEDURE — 83690 ASSAY OF LIPASE: CPT

## 2025-05-16 PROCEDURE — 83036 HEMOGLOBIN GLYCOSYLATED A1C: CPT

## 2025-05-16 RX ORDER — DEXTROSE 50 % IN WATER (D50W) INTRAVENOUS SYRINGE
25 AS NEEDED
Status: DISCONTINUED | OUTPATIENT
Start: 2025-05-16 | End: 2025-05-17

## 2025-05-16 RX ORDER — FLUCONAZOLE 2 MG/ML
200 INJECTION, SOLUTION INTRAVENOUS
Status: DISCONTINUED | OUTPATIENT
Start: 2025-05-16 | End: 2025-05-19 | Stop reason: HOSPADM

## 2025-05-16 RX ORDER — ONDANSETRON HYDROCHLORIDE 2 MG/ML
4 INJECTION, SOLUTION INTRAVENOUS EVERY 6 HOURS PRN
Status: DISCONTINUED | OUTPATIENT
Start: 2025-05-16 | End: 2025-05-19 | Stop reason: HOSPADM

## 2025-05-16 RX ORDER — METRONIDAZOLE 500 MG/100ML
500 INJECTION, SOLUTION INTRAVENOUS
Status: DISCONTINUED | OUTPATIENT
Start: 2025-05-16 | End: 2025-05-19 | Stop reason: HOSPADM

## 2025-05-16 RX ORDER — IOPAMIDOL 755 MG/ML
100 INJECTION, SOLUTION INTRAVASCULAR
Status: COMPLETED | OUTPATIENT
Start: 2025-05-16 | End: 2025-05-16

## 2025-05-16 RX ORDER — ADHESIVE BANDAGE 7/8"
15-30 BANDAGE TOPICAL AS NEEDED
Status: DISCONTINUED | OUTPATIENT
Start: 2025-05-16 | End: 2025-05-16 | Stop reason: SDUPTHER

## 2025-05-16 RX ORDER — ONDANSETRON HYDROCHLORIDE 2 MG/ML
4 INJECTION, SOLUTION INTRAVENOUS ONCE
Status: COMPLETED | OUTPATIENT
Start: 2025-05-16 | End: 2025-05-16

## 2025-05-16 RX ORDER — LAMOTRIGINE 100 MG/1
100 TABLET ORAL 2 TIMES DAILY
COMMUNITY

## 2025-05-16 RX ORDER — SODIUM CHLORIDE 9 MG/ML
INJECTION, SOLUTION INTRAVENOUS CONTINUOUS
Status: DISCONTINUED | OUTPATIENT
Start: 2025-05-16 | End: 2025-05-16

## 2025-05-16 RX ORDER — DEXTROSE 40 %
15-30 GEL (GRAM) ORAL AS NEEDED
Status: DISCONTINUED | OUTPATIENT
Start: 2025-05-16 | End: 2025-05-16 | Stop reason: SDUPTHER

## 2025-05-16 RX ORDER — ACETAMINOPHEN 10 MG/ML
1000 INJECTION, SOLUTION INTRAVENOUS EVERY 6 HOURS PRN
Status: DISPENSED | OUTPATIENT
Start: 2025-05-16 | End: 2025-05-17

## 2025-05-16 RX ORDER — DEXTROSE 40 %
15-30 GEL (GRAM) ORAL AS NEEDED
Status: DISCONTINUED | OUTPATIENT
Start: 2025-05-16 | End: 2025-05-17

## 2025-05-16 RX ORDER — INSULIN LISPRO 100 [IU]/ML
8-12 INJECTION, SOLUTION INTRAVENOUS; SUBCUTANEOUS
Status: ON HOLD | COMMUNITY
End: 2025-05-19 | Stop reason: ENTERED-IN-ERROR

## 2025-05-16 RX ORDER — DEXTROSE 50 % IN WATER (D50W) INTRAVENOUS SYRINGE
25 AS NEEDED
Status: DISCONTINUED | OUTPATIENT
Start: 2025-05-16 | End: 2025-05-16 | Stop reason: SDUPTHER

## 2025-05-16 RX ORDER — INSULIN LISPRO 100 [IU]/ML
3-5 INJECTION, SOLUTION INTRAVENOUS; SUBCUTANEOUS
Status: DISCONTINUED | OUTPATIENT
Start: 2025-05-16 | End: 2025-05-17

## 2025-05-16 RX ORDER — MORPHINE SULFATE 2 MG/ML
INJECTION, SOLUTION INTRAMUSCULAR; INTRAVENOUS
Status: DISPENSED
Start: 2025-05-16 | End: 2025-05-16

## 2025-05-16 RX ORDER — DEXTROSE MONOHYDRATE AND SODIUM CHLORIDE 5; .45 G/100ML; G/100ML
INJECTION, SOLUTION INTRAVENOUS CONTINUOUS
Status: ACTIVE | OUTPATIENT
Start: 2025-05-16 | End: 2025-05-17

## 2025-05-16 RX ORDER — ENOXAPARIN SODIUM 100 MG/ML
40 INJECTION SUBCUTANEOUS
Status: DISCONTINUED | OUTPATIENT
Start: 2025-05-16 | End: 2025-05-19 | Stop reason: HOSPADM

## 2025-05-16 RX ORDER — ADHESIVE BANDAGE 7/8"
15-30 BANDAGE TOPICAL AS NEEDED
Status: DISCONTINUED | OUTPATIENT
Start: 2025-05-16 | End: 2025-05-17

## 2025-05-16 RX ORDER — MORPHINE SULFATE 2 MG/ML
2 INJECTION, SOLUTION INTRAMUSCULAR; INTRAVENOUS ONCE
Status: COMPLETED | OUTPATIENT
Start: 2025-05-16 | End: 2025-05-16

## 2025-05-16 RX ADMIN — ENOXAPARIN SODIUM 40 MG: 40 INJECTION SUBCUTANEOUS at 18:06

## 2025-05-16 RX ADMIN — ACETAMINOPHEN 1000 MG: 10 INJECTION, SOLUTION INTRAVENOUS at 21:04

## 2025-05-16 RX ADMIN — MAGNESIUM SULFATE HEPTAHYDRATE 2 G: 40 INJECTION, SOLUTION INTRAVENOUS at 15:04

## 2025-05-16 RX ADMIN — ONDANSETRON 4 MG: 2 INJECTION INTRAMUSCULAR; INTRAVENOUS at 04:55

## 2025-05-16 RX ADMIN — METRONIDAZOLE 500 MG: 5 INJECTION, SOLUTION INTRAVENOUS at 19:55

## 2025-05-16 RX ADMIN — SODIUM CHLORIDE 1000 ML: 9 INJECTION, SOLUTION INTRAVENOUS at 04:55

## 2025-05-16 RX ADMIN — FLUCONAZOLE 200 MG: 2 INJECTION, SOLUTION INTRAVENOUS at 12:37

## 2025-05-16 RX ADMIN — DEXTROSE AND SODIUM CHLORIDE: 5; 450 INJECTION, SOLUTION INTRAVENOUS at 21:56

## 2025-05-16 RX ADMIN — INSULIN HUMAN 5 UNITS/HR: 1 INJECTION, SOLUTION INTRAVENOUS at 12:00

## 2025-05-16 RX ADMIN — MORPHINE SULFATE 2 MG: 2 INJECTION, SOLUTION INTRAMUSCULAR; INTRAVENOUS at 10:47

## 2025-05-16 RX ADMIN — ACETAMINOPHEN 1000 MG: 10 INJECTION, SOLUTION INTRAVENOUS at 11:55

## 2025-05-16 RX ADMIN — SODIUM CHLORIDE: 9 INJECTION, SOLUTION INTRAVENOUS at 06:18

## 2025-05-16 RX ADMIN — INSULIN HUMAN 4 UNITS/HR: 1 INJECTION, SOLUTION INTRAVENOUS at 12:59

## 2025-05-16 RX ADMIN — INSULIN HUMAN 2 UNITS/HR: 1 INJECTION, SOLUTION INTRAVENOUS at 15:55

## 2025-05-16 RX ADMIN — INSULIN HUMAN 3.5 UNITS/HR: 1 INJECTION, SOLUTION INTRAVENOUS at 06:26

## 2025-05-16 RX ADMIN — INSULIN HUMAN 1.5 UNITS/HR: 1 INJECTION, SOLUTION INTRAVENOUS at 07:45

## 2025-05-16 RX ADMIN — IOPAMIDOL 100 ML: 755 INJECTION, SOLUTION INTRAVENOUS at 13:44

## 2025-05-16 RX ADMIN — DEXTROSE AND SODIUM CHLORIDE: 5; 450 INJECTION, SOLUTION INTRAVENOUS at 15:10

## 2025-05-16 RX ADMIN — CEFTRIAXONE SODIUM 1 G: 1 INJECTION, POWDER, FOR SOLUTION INTRAMUSCULAR; INTRAVENOUS at 12:12

## 2025-05-16 RX ADMIN — METRONIDAZOLE 500 MG: 5 INJECTION, SOLUTION INTRAVENOUS at 12:59

## 2025-05-16 RX ADMIN — DEXTROSE AND SODIUM CHLORIDE: 5; 450 INJECTION, SOLUTION INTRAVENOUS at 09:20

## 2025-05-16 RX ADMIN — INSULIN HUMAN 1.5 UNITS/HR: 1 INJECTION, SOLUTION INTRAVENOUS at 18:01

## 2025-05-16 ASSESSMENT — ENCOUNTER SYMPTOMS
HEMATURIA: 0
DIARRHEA: 0
PALPITATIONS: 0
COUGH: 0
LIGHT-HEADEDNESS: 0
HALLUCINATIONS: 0
DIZZINESS: 0
VOMITING: 1
SHORTNESS OF BREATH: 0
ABDOMINAL PAIN: 1
NAUSEA: 1
CHILLS: 1
DYSURIA: 0
WEAKNESS: 1
COLOR CHANGE: 0
CONSTIPATION: 0
FATIGUE: 1
HEADACHES: 0
FEVER: 0

## 2025-05-16 ASSESSMENT — COGNITIVE AND FUNCTIONAL STATUS - GENERAL
CLIMB 3 TO 5 STEPS WITH RAILING: 4 - NONE
STANDING UP FROM CHAIR USING ARMS: 4 - NONE
MOVING TO AND FROM BED TO CHAIR: 4 - NONE
WALKING IN HOSPITAL ROOM: 4 - NONE

## 2025-05-16 NOTE — PLAN OF CARE
Problem: Adult Inpatient Plan of Care  Goal: Plan of Care Review  Outcome: Progressing  Goal: Patient-Specific Goal (Individualized)  Outcome: Progressing  Goal: Absence of Hospital-Acquired Illness or Injury  Outcome: Progressing  Goal: Optimal Comfort and Wellbeing  Outcome: Progressing  Goal: Readiness for Transition of Care  Outcome: Progressing    Patient oriented to room , call bell and visiting hours. Patient aware of hourly blood sugar checks and routine, serial lab work. Dad at bedside.

## 2025-05-16 NOTE — ASSESSMENT & PLAN NOTE
To prevent mediastinitis continue with antibiotics as above  The patient is also on fluconazole which is typically not necessary unless the surgical team specifically requested it

## 2025-05-16 NOTE — ASSESSMENT & PLAN NOTE
Pt with hyperglycemia, anion gap metabolic acidosis, elevated beta hydroxybutyrate, likely DKA     - insulin gtt, Q1 hr accu checks while on gtt  - frequent BMPs  - aggressive IVF resuscitation  - endocrine consult  - NPO for now  - appropriate DVT ppx

## 2025-05-16 NOTE — PROGRESS NOTES
Critical Care Progress Note      INTERVAL SUMMARY    Admitted overnight with DKA  On insulin gtt, NPO  CT abd/pelvis shows evidence of pneumomediastinum concerning for esophageal tear  Patient does not feel well. Denies nausea but admits to severe abdominal pain and chest pain. Will reach out to thoracic surgery for input     OBJECTIVE   VITAL SIGNS  Temp:  [36.6 °C (97.8 °F)-36.8 °C (98.3 °F)] 36.8 °C (98.3 °F)  Heart Rate:  [] 97  Resp:  [19-31] 28  BP: (104-138)/(58-98) 104/58       Intake/Output Summary (Last 24 hours) at 5/16/2025 1137  Last data filed at 5/16/2025 1052  Gross per 24 hour   Intake 1562.6 ml   Output 225 ml   Net 1337.6 ml       O2 Requirement:  Oxygen Therapy: None (Room air)                MEDICATIONS  cefTRIAXone, 1 g, q24h INT  fluconazole, 200 mg, q24h INT  [Provider Managed Hold] insulin lispro U-100, 3-5 Units, With meals & nightly  metroNIDAZOLE, 500 mg, q8h INT           LAB RESULTS  Recent Results (from the past 24 hours)   POCT Glucose    Collection Time: 05/16/25  4:23 AM   Result Value Ref Range    POCT Bedside Glucose 301 (H) 70 - 99 mg/dL    POC Test POC    CBC and differential    Collection Time: 05/16/25  4:32 AM   Result Value Ref Range    WBC 23.04 (H) 3.80 - 10.50 K/uL    RBC 5.41 4.50 - 5.80 M/uL    Hemoglobin 16.4 13.7 - 17.5 g/dL    Hematocrit 47.1 40.1 - 51.0 %    MCV 87.1 83.0 - 98.0 fL    MCH 30.3 28.0 - 33.2 pg    MCHC 34.8 32.2 - 36.5 g/dL    RDW 12.0 11.6 - 14.4 %    Platelets 365 (H) 150 - 350 K/uL    MPV 11.5 9.4 - 12.4 fL    Differential Type Manu     Neutrophils 85 %    Lymphocytes 7 %    Monocytes 6 %    Eosinophils 2 %    Basophils 0 %    Neutrophils, Absolute 19.58 (H) 1.70 - 7.00 K/uL    Lymphocytes, Absolute 1.61 1.20 - 3.50 K/uL    Monocytes, Absolute 1.38 (H) 0.30 - 1.00 K/uL    Eosinophils, Absolute 0.46 0.04 - 0.54 K/uL    Basophils, Absolute 0.00 (L) 0.01 - 0.10 K/uL    RBC Morphology Normal     Platelet Estimate Adequate (150,000-400,000)      Clumped Platelets Present    Comprehensive metabolic panel    Collection Time: 05/16/25  4:32 AM   Result Value Ref Range    Sodium 132 (L) 136 - 145 mEQ/L    Potassium 5.0 3.5 - 5.1 mEQ/L    Chloride 95 (L) 98 - 107 mEQ/L    CO2 9 (LL) 21 - 31 mEQ/L    BUN 10 7 - 25 mg/dL    Creatinine 1.2 0.7 - 1.3 mg/dL    Glucose 326 (H) 70 - 99 mg/dL    Calcium 10.8 (H) 8.6 - 10.3 mg/dL    AST (SGOT) 16 13 - 39 IU/L    ALT (SGPT) 19 7 - 52 IU/L    Alkaline Phosphatase 106 34 - 125 IU/L    Total Protein 9.4 (H) 6.0 - 8.2 g/dL    Albumin 5.1 3.5 - 5.7 g/dL    Bilirubin, Total 0.5 0.3 - 1.2 mg/dL    eGFR >60.0 >=60.0 mL/min/1.73m*2    Anion Gap 28 (H) 3 - 15 mEQ/L   Magnesium    Collection Time: 05/16/25  4:32 AM   Result Value Ref Range    Magnesium 1.8 1.8 - 2.5 mg/dL   Beta Hydroxybutyrate    Collection Time: 05/16/25  4:32 AM   Result Value Ref Range    Beta-Hydroxybutyrate 8.94 (H) <=0.28 mmol/L   Lipase    Collection Time: 05/16/25  4:32 AM   Result Value Ref Range    Lipase 7 (L) 11 - 82 U/L   Hemoglobin A1c    Collection Time: 05/16/25  4:32 AM   Result Value Ref Range    Hemoglobin A1C 8.3 (H) <5.7 %    Estimated Ave Glucose 192 mg/dL   VBG Comprehensive    Collection Time: 05/16/25  4:52 AM   Result Value Ref Range    pH, Venous 7.13 (LL) 7.32 - 7.42    pCO2, Venous 23 (LL) 41 - 51 mm Hg    pO2, Venous 47 (H) 25 - 40 mm Hg    HCO3, Venous 9.0 (L) 21.0 - 28.0 mEQ/L    Base Excess, Venous -19.6 mEQ/L    Oxygen Saturation, Venous 80 (H) 30 - 60 %    TCO2, Venous 8.4 (LL) 22.0 - 32.0 mEQ/L    Lactate 2.4 (H) 0.4 - 2.0 mmol/L    Glucose, Venous 386 (H) 70 - 99 mg/dL    Sodium Venous 127 (L) 136 - 145 mEQ/L    Potassium, Venous 5.3 (H) 3.4 - 4.5 mEQ/L    Chloride, Venous 97 (L) 98 - 109 mEQ/L    Ionized Calcium, Venous 1.26 1.15 - 1.27 mmol/L    Hemoglobin, Venous 17.3 14.0 - 17.5 g/dL    Carboxyhemoglobin 1.3 0.0 - 3.0; (Smokers: 0.0 - 9.0) %    Methemoglobin 0.9 0.4 - 1.5 %    Source Of Oxygen RA     FIO2 RA    Lactate, w/  reflex repeat if > 2.0    Collection Time: 05/16/25  4:52 AM   Result Value Ref Range    Lactate 2.4 (H) 0.4 - 2.0 mmol/L   ECG 12 lead    Collection Time: 05/16/25  5:06 AM   Result Value Ref Range    Ventricular rate 113     Atrial rate 113     CA Interval 146     QRS duration 104     QT Interval 338     QTC Calculation(Bazett) 463     P Axis 73     R Axis 47     T Wave Axis 40    Beta Hydroxybutyrate    Collection Time: 05/16/25  6:18 AM   Result Value Ref Range    Beta-Hydroxybutyrate 8.58 (H) <=0.28 mmol/L   POCT Glucose    Collection Time: 05/16/25  6:23 AM   Result Value Ref Range    POCT Bedside Glucose 294 (H) 70 - 99 mg/dL    POC Test POC    POCT Glucose    Collection Time: 05/16/25  7:43 AM   Result Value Ref Range    POCT Bedside Glucose 193 (H) 70 - 99 mg/dL    POC Test POC    POCT Glucose    Collection Time: 05/16/25  8:33 AM   Result Value Ref Range    POCT Bedside Glucose 171 (H) 70 - 99 mg/dL    POC Test POC    Lactic Acid (Repeat)    Collection Time: 05/16/25  8:47 AM   Result Value Ref Range    Lactate 1.3 0.4 - 2.0 mmol/L   Basic metabolic panel    Collection Time: 05/16/25  8:47 AM   Result Value Ref Range    Sodium 134 (L) 136 - 145 mEQ/L    Potassium 5.0 3.5 - 5.1 mEQ/L    Chloride 101 98 - 107 mEQ/L    CO2 11 (L) 21 - 31 mEQ/L    BUN 9 7 - 25 mg/dL    Creatinine 1.0 0.7 - 1.3 mg/dL    Glucose 180 (H) 70 - 99 mg/dL    Calcium 9.8 8.6 - 10.3 mg/dL    eGFR >60.0 >=60.0 mL/min/1.73m*2    Anion Gap 22 (H) 3 - 15 mEQ/L   Phosphorus    Collection Time: 05/16/25  8:47 AM   Result Value Ref Range    Phosphorus 3.6 2.4 - 4.7 mg/dL   Magnesium    Collection Time: 05/16/25  8:47 AM   Result Value Ref Range    Magnesium 2.0 1.8 - 2.5 mg/dL   POCT Venous Blood Gas    Collection Time: 05/16/25  8:53 AM   Result Value Ref Range    pH, Venous 7.25 (L) 7.32 - 7.42 pH    pCO2, Venous 25 (LL) 41 - 51 mm Hg    pO2, Venous 41 (H) 25 - 40 mm Hg    HCO3, Venous 13 (L) 21 - 28 mEQ/L    Base Excess, Venous -14.2 mEQ/L     Oxygen Saturation, Venous 73 (H) 30 - 60 %    TCO2, Venous 12 (L) 22 - 32 mEQ/L    Lactate, Venous 1.3 0.4 - 2.0 mmol/L    Glucose, Venous 195 (H) 70 - 99 mg/dL    Sodium Venous 130 (L) 136 - 145 mEQ/L    Potassium, Venous 5.2 (H) 3.4 - 4.5 mEQ/L    Chloride, Venous 101 98 - 109 mEQ/L    Ionized Calicum, Venous 1.29 (H) 1.15 - 1.27 mmol/L    Hemoglobin, Venous 16.5 14.0 - 17.5 g/dL    Patient Temperature 98.6 97.0 - 99.0 °F    POC Test POC    POCT Glucose    Collection Time: 05/16/25  9:20 AM   Result Value Ref Range    POCT Bedside Glucose 185 (H) 70 - 99 mg/dL    POC Test POC    POCT Glucose    Collection Time: 05/16/25 10:00 AM   Result Value Ref Range    POCT Bedside Glucose 195 (H) 70 - 99 mg/dL    POC Test POC    POCT Glucose    Collection Time: 05/16/25 10:52 AM   Result Value Ref Range    POCT Bedside Glucose 222 (H) 70 - 99 mg/dL    POC Test POC      Laboratory results were independently reviewed    ABG  Results from last 7 days   Lab Units 05/16/25  0452   SOURCE OF OXYGEN  RA         CULTURES  Microbiology Results       ** No results found for the last 720 hours. **            Laboratory results were independently reviewed    RADIOLOGY  X-RAY CHEST 1 VIEW  Result Date: 5/16/2025  CLINICAL HISTORY: Altered mental status COMPARISON: None COMMENT: TECHNIQUE: Single frontal view of the chest was performed. LUNGS AND PLEURA: There is subtle airspace opacity noted in the left lower lobe and retrocardiac region that could represent atelectasis and/or infiltrate. Remaining lung fields are clear. No pleural effusion. No pneumothorax. CARDIOMEDIASTINUM: Cardiomediastinal silhouette appears unchanged. SKELETON/OTHER: No acute osseous abnormality.     IMPRESSION: There is airspace opacity in the retrocardiac and left lower lobe region that could represent atelectasis and/or infiltrate. Follow-up chest radiograph is recommended to document complete resolution.     CT ABDOMEN PELVIS WITHOUT IV CONTRAST  Result Date:  5/16/2025  CLINICAL HISTORY:   Abdominal pain. COMPARISON:    None COMMENT: Technique: CT of the Abdomen and Pelvis was performed without IV contrast. The lack of IV contrast limits the detection of certain disease processes, particularly infection and malignancy. Oral contrast was not administered. CT DOSE:  One or more dose reduction techniques (e.g. automated exposure control, adjustment of the mA and/or kV according to patient size, use of iterative reconstruction technique) utilized for this examination. COMMENT: LOWER CHEST: There are prominent tiny tree-in-bud nodular opacities within the visualized left lower lobe.  There is partially visualized pneumomediastinum surrounding the distal esophagus. ABDOMEN: Liver: within normal limits. Bile ducts: Normal caliber. Gallbladder: No calcified gallstones.  Normal caliber wall. Pancreas: Normal Spleen: within normal limits. Adrenals: within normal limits. Kidneys: within normal limits. Ureters:  No ureteral calculi. PELVIS: Reproductive organs: No pelvic masses. Bladder: within normal limits. Peritoneum: No pneumoperitoneum or ascites. Bowel: The bowel is normal in caliber without evidence of obstruction.  There is no bowel wall thickening or adjacent inflammation.  The appendix is not definitely seen, however there is no focal inflammation noted adjacent cecum. Vessels:  within normal limits. Lymph Nodes:  No enlarged nodes Abdominal wall: within normal limits. Bones: within normal limits.     IMPRESSION: 1.   No evidence of acute abdominopelvic pathology on this noncontrast exam. 2.  Partially visualized pneumomediastinum surrounding the distal esophagus.  If there is concern for esophageal perforation, consider further evaluation with a fluoroscopic or CT esophagram.  Prominent tiny tree-in-bud nodular opacities throughout the imaged left lower lobe which may be on the basis of an infectious/inflammatory small airways disease/bronchiolitis or aspiration. Findings  and recommendations discussed with GERARD Pinto by Dr. Boo by telephone at 9:45 AM on 5/16/2025.    Radiography was independently reviewed.      Telemetry: sinus tachycardia    VTE Risk Assessment and Plan  Lovenox    GI prophylaxis  Not indicated      PHYSICAL EXAM    GEN: WDWN, critically ill  HENT: NC/AT, no deformity  NECK: supple, no lymphadenopathy  CARD: RRR, no rubs, murmur, gallops  RESP: CTA, no wheeze, rales, crackles  Gi: soft, NTND, +BS  EXT: no edema, pulses present  SKIN: warm, dry, no rash  NEURO: AAOx3, nonfocal      ASSESSMENT & PLAN    Assessment & Plan  Diabetic ketoacidosis without coma associated with type 1 diabetes mellitus (CMS/HCC)  Pt with hyperglycemia, anion gap metabolic acidosis, elevated beta hydroxybutyrate, likely DKA     - insulin gtt, Q1 hr accu checks while on gtt  - frequent BMPs  - aggressive IVF resuscitation  - endocrine consult  - NPO for now  - appropriate DVT ppx   Hyponatremia  Likely 2nd to hypovolemia, hyperglycemia  Continue IVFs, now on dextrose-containing fluids   Metabolic acidosis  Likely 2nd to dka, also elevated lactate  Improving     - Continue IVF resuscitation  - Follow chemistries per DKA protocol  - Follow venous blood gas   Leukocytosis  Suspect reactive 2nd to DKA but will check CT A/P to rule out acute intra abdominal condition/process  Pneumomediastinum (CMS/HCC)  Aspiration pneumonia (CMS/HCC)  CT abd/pelvis shows pneumomediastinum as well as left lower lobe lung consolidation  Concern for esophageal tear in setting of multiple days of wretching/emesis   ?superimposed Aspiration PNA     - SpO2 goal > 92%  - CXR daily  - thoracic surgery consult  - fluoroscopy barium swallow, no obvious evidence of esophageal leak but redemonstrates of pneumomediastinum and subcutaneous air in neck and soft tissues   - keep NPO for now   - low threshold to get stat CT esophagram with new fever, significant increase in WBC, or other signs of developing infection  -  will start empiric broad spectrum abx coverage  - ID now following        Acting as a scribe in the presence of GERARD Lopez C    Seen and evaluated.  Performed  the critical portions of history and physical exam.  Available medical records reviewed  All laboratory, diagnostic studies and imaging reviewed   Communicated directly with consultants and coordinated care  Provided counseling and education where appropriate  Performed decision making and formation of treatment plan.    I have reviewed and agree with the note.as written  Olvin Boucher MD  CCT-  35  Min

## 2025-05-16 NOTE — ASSESSMENT & PLAN NOTE
Possible aspiration pneumonia  Rocephin 1 g daily  Metronidazole 500 3 times daily  Recommend 5 days of treatment which can be switched to oral once the patient is cleared by the surgical team

## 2025-05-16 NOTE — CONSULTS
General Surgery Consult    Subjective     Levi Joya is a 19 y.o. male who was admitted for Diabetic ketoacidosis without coma associated with type 1 diabetes mellitus (CMS/HCC) [E10.10]. Patient was seen in consultation at the request of referring physician for management recommendations. Patient is 19 year old male with a history of type 1 diabetes, epilepsy, and celiac disease. He presented to the ED in DKA yesterday evening. Per the patient and his father, his Dexcom device fell off Wednesday night or Thursday, and he developed severe cramping abdominal pain, nausea, and innumerable episodes of emesis. He was admitted to the ICU for management of his DKA. This morning, he had sudden onset of sharp and pleuritic chest pain centered over his sternum. He states it is difficult to take a deep breath secondary to the pain. A CT scan performed this morning showed partially visualized pneumoperitoneum around the esophagus, concerning for an esophageal perforation given the clinical context.    Currently, the patient is reporting diffuse abdominal pain and pleuritic chest pain. He is on room air and is intermittently tachycardic to the 110s. His admission labs were significant for hyponatremia to 132, , WBC 23 and a lactate of 2.4.    PMH: celiac, DM1, epilepsy  PSH: none  Meds: insulin, lamotrigine  NKDA    Medical History:   Past Medical History:   Diagnosis Date    Celiac disease     Diabetes mellitus type I (CMS/HCC)     Epilepsy (CMS/HCC)        Surgical History: No past surgical history on file.    Social History:   Social History     Social History Narrative    Not on file       Family History: No family history on file.    Allergies: Patient has no known allergies.    Home Medications:   acetaminophen (OFIRMEV) injection 1,000 mg  1,000 mg intravenous q6h PRN    cefTRIAXone (ROCEPHIN) IVPB 1 g in 100 mL NSS vial in bag  1 g intravenous q24h INT    D5 % and 0.45 % sodium chloride infusion   intravenous  Continuous    glucose chewable tablet 15-30 g of dextrose  15-30 g of dextrose oral PRN    Or    dextrose 40 % oral gel 15-30 g of dextrose  15-30 g of dextrose oral PRN    Or    glucagon (GLUCAGEN) injection 1 mg  1 mg intramuscular PRN    Or    dextrose 50 % in water (D50) injection 12.5 g  25 mL intravenous PRN    fluconazole in NaCl (iso-osm) (DIFLUCAN) IVPB 200 mg  200 mg intravenous q24h INT    [Provider Managed Hold] insulin lispro U-100 (HumaLOG) pen 3-5 Units  3-5 Units subcutaneous With meals & nightly    insulin regular 100 unit/100 mL (1 unit/mL) infusion in NSS  0-24 Units/hr intravenous Continuous    metroNIDAZOLE in NaCl (iso-os) (FLAGYL) IVPB 500 mg  500 mg intravenous q8h INT    ondansetron (ZOFRAN) injection 4 mg  4 mg intravenous q6h PRN       Current Medications:  Current Facility-Administered Medications   Medication Dose Route Frequency Provider Last Rate Last Admin    acetaminophen (OFIRMEV) injection 1,000 mg  1,000 mg intravenous q6h PRN Joelle Lofton PA C        cefTRIAXone (ROCEPHIN) IVPB 1 g in 100 mL NSS vial in bag  1 g intravenous q24h INT Joelle Lofton PA C        D5 % and 0.45 % sodium chloride infusion   intravenous Continuous Joelle Lofton PA C 150 mL/hr at 05/16/25 1000 Rate Verify at 05/16/25 1000    glucose chewable tablet 15-30 g of dextrose  15-30 g of dextrose oral PRN Nazia Fernandes PA C        Or    dextrose 40 % oral gel 15-30 g of dextrose  15-30 g of dextrose oral PRN Nazia Fernandes PA C        Or    glucagon (GLUCAGEN) injection 1 mg  1 mg intramuscular PRN Nazia Fernandes PA C        Or    dextrose 50 % in water (D50) injection 12.5 g  25 mL intravenous PRN Nazia Fernandes PA C        fluconazole in NaCl (iso-osm) (DIFLUCAN) IVPB 200 mg  200 mg intravenous q24h INT Joelle Lofton PA C        [Provider Managed Hold] insulin lispro U-100 (HumaLOG) pen 3-5 Units  3-5 Units subcutaneous With meals & nightly Nazia Fernandes PA C         "insulin regular 100 unit/100 mL (1 unit/mL) infusion in NSS  0-24 Units/hr intravenous Continuous Nazia Fernandes PA C 5 mL/hr at 05/16/25 1052 5 Units/hr at 05/16/25 1052    metroNIDAZOLE in NaCl (iso-os) (FLAGYL) IVPB 500 mg  500 mg intravenous q8h INT Joelle Lofton PA C        ondansetron (ZOFRAN) injection 4 mg  4 mg intravenous q6h PRN Maira Asher MD           Review of Systems  Pertinent items are noted in HPI.    Objective     Physicial Exam  Visit Vitals  BP (!) 104/58 (BP Location: Left upper arm, Patient Position: Lying)   Pulse 97   Temp 36.8 °C (98.3 °F) (Tympanic)   Resp (!) 28   Ht 1.803 m (5' 11\")   Wt 64.7 kg (142 lb 10.2 oz)   SpO2 96%   BMI 19.89 kg/m²       Physical Exam  Constitutional:       Appearance: Normal appearance.   Cardiovascular:      Rate and Rhythm: Regular rhythm. Tachycardia present.   Pulmonary:      Effort: Pulmonary effort is normal. No respiratory distress.      Comments: Chest wall non-tender, no crepitus  Abdominal:      General: There is no distension.      Palpations: Abdomen is soft.      Comments: Epigastric tenderness   Musculoskeletal:         General: Normal range of motion.      Cervical back: Normal range of motion.   Skin:     General: Skin is warm and dry.   Neurological:      General: No focal deficit present.      Mental Status: He is alert and oriented to person, place, and time.   Psychiatric:         Mood and Affect: Mood normal.         Behavior: Behavior normal.         Labs  I have reviewed the patient's labs.  Significant abnormals are noted in the HPI.    Imaging  I have reviewed the Imaging from the last 24 hrs.    Assessment     19M with DKA and emesis, now with chest pain and pneumomediastinum on CT. C/f esophageal perforation         Plan     - Upper GI with water-soluble contrast  - STRICT NPO  - broad-spectrum antibiotics and antifungal coverage  - Discussed with Dr Peoples; plan communicated to ICU team     Ros Brown MD  "

## 2025-05-16 NOTE — ASSESSMENT & PLAN NOTE
Likely 2nd to dka, also elevated lactate  Continue ivf resuscitation  Follow chemistries per dka protocol  Follow venous blood gas

## 2025-05-16 NOTE — CONSULTS
Endocrinology Consult Note  Subjective     Levi Joya is a 19 y.o. male who was admitted for Diabetic ketoacidosis without coma associated with type 1 diabetes mellitus (CMS/HCC) [E10.10]. He goes to school in [place] and was up for his sister's graduation. Parents in room during visit. He was on insulin gtt at 4 units/hr with bg 155 during visit. He takes lantus 5.5 units hs and humalog 8-12 units depending on meals. Diagnosed at age 17 and no recent dka. Does not recall being sick or missing insulin. No excessive ETOH. Has had exams and has been stressed and eating things different than he usually would.     Medical History:   Past Medical History:   Diagnosis Date    Celiac disease     Diabetes mellitus type I (CMS/HCC)     Epilepsy (CMS/[facility])        Surgical History: No past surgical history on file.    Social History:   Social History     Social History Narrative    Not on file       Family History: No family history on file.    Allergies: Patient has no known allergies.    Current Medications[1]    Review of Systems  All other systems reviewed and negative except as noted in the HPI.    Objective     Physical Exam  Nad  Neck supple   No thyromegaly  Abd s, nt  No LE edema  Resp unlabored  Cv reg rate  aaox3        Assessment   19 y.o. male being consulted for management recommendations       Plan     DKA- type 1 diabetic. At home, in [place], he is on lantus 5.5 units hs and humalog 8-12 units depending on meal and bg. Has cgms at home. Thinks last A1c was good. Stressed with finals eating different foods than normal otherwise has been well, not missing insulin. Cont npo, IVF, insulin gtt per protocol. AG coming down. Will monitor for now on insulin gtt until able to eat . Right now NPO as he is undergoing work up for possible perforated esophagus after excessive vomiting. Surgery following. ID has him on abx. Will follow along and when cleared to eat can then consider changing off insulin gtt after tolerates a  couple meals with drip running to assist with requirements. A1c 8.3%        [1]   Current Inpatient Medications   Medication Dose Route Frequency Provider Last Rate Last Admin    acetaminophen (OFIRMEV) injection 1,000 mg  1,000 mg intravenous q6h PRN Joelle Lofton PA C   Stopped at 05/16/25 1212    cefTRIAXone (ROCEPHIN) IVPB 1 g in 100 mL NSS vial in bag  1 g intravenous q24h INT Joelle Lofton PA C   Stopped at 05/16/25 1242    D5 % and 0.45 % sodium chloride infusion   intravenous Continuous Joelle Lofton PA C 150 mL/hr at 05/16/25 1510 New Bag at 05/16/25 1510    glucose chewable tablet 15-30 g of dextrose  15-30 g of dextrose oral PRN Nazia Fernandes PA C        Or    dextrose 40 % oral gel 15-30 g of dextrose  15-30 g of dextrose oral PRN Nazia Fernandes PA C        Or    glucagon (GLUCAGEN) injection 1 mg  1 mg intramuscular PRN Nazia Fernandes PA C        Or    dextrose 50 % in water (D50) injection 12.5 g  25 mL intravenous PRN Nazia Fernandes PA C        fluconazole in NaCl (iso-osm) (DIFLUCAN) IVPB 200 mg  200 mg intravenous q24h INT Joelle Lofton PA C   Stopped at 05/16/25 1337    [Provider Managed Hold] insulin lispro U-100 (HumaLOG) pen 3-5 Units  3-5 Units subcutaneous With meals & nightly Nazia Fernandes PA C        insulin regular 100 unit/100 mL (1 unit/mL) infusion in NSS  0-24 Units/hr intravenous Continuous Nazia Fernandes PA C 2 mL/hr at 05/16/25 1555 2 Units/hr at 05/16/25 1555    magnesium sulfate IVPB 2g in 50 mL NSS/D5W/SWFI  2 g intravenous Once Joelle Lofton PA C 25 mL/hr at 05/16/25 1504 2 g at 05/16/25 1504    metroNIDAZOLE in NaCl (iso-os) (FLAGYL) IVPB 500 mg  500 mg intravenous q8h INT Joelle Lofton PA C   Stopped at 05/16/25 1359    ondansetron (ZOFRAN) injection 4 mg  4 mg intravenous q6h PRN Maira Asher MD

## 2025-05-16 NOTE — ASSESSMENT & PLAN NOTE
Suspect reactive 2nd to DKA but will check CT A/P to rule out acute intra abdominal condition/process

## 2025-05-16 NOTE — ASSESSMENT & PLAN NOTE
Pt with hyperglycemia, anion gap metabolic acidosis, elevated beta hydroxybutyrate, likely dka  Admit to icu  Follow vitals per protocol  Insulin drip protocol initiated,   Follow frequent BS, chemistries for resolution of anion gap  Continue ivfs  Consult endocrine.

## 2025-05-16 NOTE — ED ATTESTATION NOTE
I have personally seen and examined Levi Joya.  I was involved in the care and medical decision making for this patient.    I reviewed and agree with physician assistant / nurse practitioner’s assessment and plan of care; any exceptions are documented below.      My focused history, examination, assessment and plan of care of Levi Joya is as follows:    Brief History:  Levi Joya is a 19 y.o. male w/ PMHx of DM1 who presents to the ED c/o nausea vomiting, diffuse abdominal pain which began earlier today.  Patient states his Dexcom fell off at home last insulin 7 units this morning, reports blood sugar in the 300s.  Denies fever, no diarrhea or constipation, no chest pain, no shortness of breath, no other complaints.    Focused Physical Exam:  Physical Exam  Constitutional:       General: He is not in acute distress.     Appearance: He is ill-appearing. He is not diaphoretic.   HENT:      Mouth/Throat:      Mouth: Mucous membranes are moist.   Eyes:      Conjunctiva/sclera: Conjunctivae normal.   Cardiovascular:      Rate and Rhythm: Regular rhythm. Tachycardia present.      Heart sounds: No murmur heard.  Pulmonary:      Effort: Pulmonary effort is normal. No respiratory distress.      Breath sounds: Normal breath sounds.   Abdominal:      General: Bowel sounds are normal. There is no distension.      Palpations: Abdomen is soft.      Tenderness: There is abdominal tenderness (mild diffuse). There is no guarding or rebound.   Skin:     General: Skin is warm and dry.   Neurological:      General: No focal deficit present.      Mental Status: He is alert and oriented to person, place, and time.       MEDICAL DECISION MAKING:  Patient is a 19 y.o. male who presents with complaint of hyperglycemia, nausea vomiting, abdominal pain. Differential diagnosis includes DKA, hyperglycemia, electrolyte derangement, ADAMARIS, dehydration.    History provided by: Patient, father at bedside    External documents  reviewed:   Gastroenterology office visit reviewed 1/2/25.    Interpretation of all associated lab work, imaging findings and ECG (as applicable) per ED course.     All reevaluation findings and consultations detailed in ED course.    Problems Addressed:  Comorbidities include DM1    The encounter diagnosis was Diabetic ketoacidosis without coma associated with type 1 diabetes mellitus (CMS/HCC). (acute)    Rationale for disposition: Patient with acute DKA requiring further evaluation, treatment and monitoring in the hospital to prevent clinical deterioration.         Serg Ann DO  05/16/25 0535

## 2025-05-16 NOTE — ASSESSMENT & PLAN NOTE
Likely 2nd to hypovolemia, hyperglycemia  Continue ivfs with NS  Change to dextrose containing solution when BS improves.

## 2025-05-16 NOTE — PLAN OF CARE
Care Coordination Admission Assessment Note    General Information:  Readmission Within the last 30 days: no previous admission in last 30 days  Does patient have a : No  Patient-Specific Goals (include timeframe):      Living Arrangements:  Arrived From: home  Current Living Arrangements: home  People in Home: parent(s), sibling(s)  Home Accessibility: stairs to enter home (Group), stairs within home (Group)  Living Arrangement Comments: lives in [place] w. parents and Siblings. 2 SH, bed/bath on 2nd floor. Currently at the [facility] visiting for his sisters graduation.    Housing Stability and Utility Access (SDOH):  In the last 12 months, was there a time when you were not able to pay the mortgage or rent on time?: No  In the past 12 months, how many times have you moved?: 0  At any time in the past 12 months, were you homeless or living in a shelter (including now)?: No  In the past 12 months has the electric, gas, oil, or water company threatened to shut off services in your home?: No    Functional Status Prior to Admission:   Assistive Device/Animal Currently Used at Home: glucometer (Dexcom)  Functional Status Comments: indep  IADL Comments: indep     Supports and Services:  Current Outpatient/Agency/Support Group: none  Type of Current Home Care Services: none  History of home care episode or rehab stay: none    Discharge Needs Assessment:   Concerns to be Addressed: no discharge needs identified, denies needs/concerns at this time  Current Discharge Risk: none  Anticipated Changes Related to Illness: none    Patient/Family Anticipated Discharge Plan:  Patient/Family Anticipates Transition To: home with family  Patient/Family Anticipated Services at Transition: none    Connection to Community  Not applicable    Patient Choice:   Offered/Gave Vendor List: no       Anticipated Discharge Plan:  Met with patient. Provided education and contact information for Care Coordination services.:  yes  Anticipated Discharge Disposition: home without assistance or services     Transportation Needs (SDOH):  Transportation Concerns: none  Transportation Anticipated: family or friend will provide  Is Out of Hospital DNR needed at discharge?: no    In the past 12 months, has lack of transportation kept you from medical appointments or from getting medications?: No  In the past 12 months, has lack of transportation kept you from meetings, work, or from getting things needed for daily living?: No    Concerns - comments: d/c home with family. will return to [place]. Locally was staying at the [facility]. but plans to go home to [place] when ready to leave the hospital.     Here with DKA after his dexcom fell off. Patient says he does have an extra with him at the [facility]. [facility] added to chart in case he needs any new scripts before returning home.     Dispo- home, plans to return to [place] w/ family

## 2025-05-16 NOTE — ED PROVIDER NOTES
Emergency Medicine Note  HPI   HISTORY OF PRESENT ILLNESS   Patient is a 19-year-old male with a past medical history of type 1 diabetes, celiac disease, epilepsy, who comes to the ED due to nausea, vomiting and general malaise.  Patient reports about 2 days ago he started presenting with fatigue, congestion, coughing, body aches, yesterday his Dexcom fell off, got off a plane around 1030, he was presenting worsening fatigue, nausea and vomiting nonstop since that time for which he was brought to the ED for further evaluation.  He admits to body aches, epigastric pain, generalized weakness.  HPI      Patient History   PAST HISTORY     Reviewed from Nursing Triage:       Past Medical History:   Diagnosis Date    Celiac disease     Diabetes mellitus type I (CMS/[facility])     Epilepsy (CMS/[facility])        No past surgical history on file.    No family history on file.    Social History     Tobacco Use    Smoking status: Never    Smokeless tobacco: Never   Vaping Use    Vaping status: Never Used   Substance Use Topics    Alcohol use: Never    Drug use: Never         Review of Systems   REVIEW OF SYSTEMS     Review of Systems   Constitutional:  Positive for chills and fatigue. Negative for fever.   Respiratory:  Negative for cough and shortness of breath.    Cardiovascular:  Negative for chest pain and palpitations.   Gastrointestinal:  Positive for abdominal pain, nausea and vomiting. Negative for constipation and diarrhea.   Genitourinary:  Negative for dysuria and hematuria.   Skin:  Negative for color change and rash.   Neurological:  Positive for weakness. Negative for dizziness, syncope, light-headedness and headaches.   Psychiatric/Behavioral:  Negative for hallucinations.    All other systems reviewed and are negative.        VITALS     ED Vitals      Date/Time Temp Pulse Resp BP SpO2 [facility]   05/16/25 0750 36.8 °C (98.3 °F) 102 20 126/84 97 % JBK   05/16/25 0637 -- 102 -- -- -- DFI   05/16/25 0620 36.7 °C (98 °F) 105 20  138/77 98 % JBK   05/16/25 0424 36.6 °C (97.8 °F) 133 19 134/98 96 % SL          Pulse Ox %: 96 % (05/16/25 0511)  Pulse Ox Interpretation: Normal (05/16/25 0511)  Heart Rate: 133 (05/16/25 0511)  Rhythm Strip Interpretation: Sinus Tachycardia (05/16/25 0511)     Physical Exam   PHYSICAL EXAM     Physical Exam  Constitutional:       General: He is not in acute distress.     Appearance: He is ill-appearing and diaphoretic. He is not toxic-appearing.      Comments: Patient is alert, answers questions, looks acutely ill, actively vomiting during evaluation   HENT:      Head: Normocephalic and atraumatic.   Cardiovascular:      Rate and Rhythm: Regular rhythm. Tachycardia present.      Pulses: Normal pulses.      Heart sounds: Normal heart sounds. No murmur heard.     No friction rub. No gallop.   Pulmonary:      Effort: Pulmonary effort is normal.      Breath sounds: Normal breath sounds. No wheezing, rhonchi or rales.   Abdominal:      General: Bowel sounds are normal.      Tenderness: There is no abdominal tenderness. There is no guarding or rebound.   Musculoskeletal:      Right lower leg: No edema.      Left lower leg: No edema.   Skin:     Findings: No lesion or rash.   Neurological:      General: No focal deficit present.      Mental Status: He is alert and oriented to person, place, and time.           PROCEDURES     Procedures     DATA     Results       Procedure Component Value Units Date/Time    Magnesium [[id number]]  (Normal) Collected: 05/16/25 2003    Specimen: Blood, Venous Updated: 05/16/25 2050     Magnesium 2.1 mg/dL     Basic metabolic panel [[id number]]  (Abnormal) Collected: 05/16/25 2003    Specimen: Blood, Venous Updated: 05/16/25 2050     Sodium 135 mEQ/L      Potassium 4.6 mEQ/L      Chloride 103 mEQ/L      CO2 19 mEQ/L      BUN 6 mg/dL      Creatinine 0.9 mg/dL      Glucose 218 mg/dL      Calcium 9.6 mg/dL      eGFR >60.0 mL/min/1.73m*2      Comment: Calculation based on the Chronic Kidney  Disease Epidemiology Collaboration (CKD-EPI) equation refit without adjustment for race.        Anion Gap 13 mEQ/L     Phosphorus [[id number]]  (Normal) Collected: 05/16/25 2003    Specimen: Blood, Venous Updated: 05/16/25 2050     Phosphorus 3.2 mg/dL     Magnesium [[id number]]  (Normal) Collected: 05/16/25 1628    Specimen: Blood, Venous Updated: 05/16/25 1700     Magnesium 2.4 mg/dL     Basic metabolic panel [[id number]]  (Abnormal) Collected: 05/16/25 1628    Specimen: Blood, Venous Updated: 05/16/25 1700     Sodium 135 mEQ/L      Potassium 4.0 mEQ/L      Chloride 105 mEQ/L      CO2 16 mEQ/L      BUN 6 mg/dL      Creatinine 0.8 mg/dL      Glucose 156 mg/dL      Calcium 9.9 mg/dL      eGFR >60.0 mL/min/1.73m*2      Comment: Calculation based on the Chronic Kidney Disease Epidemiology Collaboration (CKD-EPI) equation refit without adjustment for race.        Anion Gap 14 mEQ/L     Phosphorus [[id number]]  (Normal) Collected: 05/16/25 1628    Specimen: Blood, Venous Updated: 05/16/25 1700     Phosphorus 3.3 mg/dL     Basic metabolic panel [[id number]]  (Abnormal) Collected: 05/16/25 1221    Specimen: Blood, Venous Updated: 05/16/25 1306     Sodium 133 mEQ/L      Potassium 4.3 mEQ/L      Chloride 103 mEQ/L      CO2 14 mEQ/L      BUN 7 mg/dL      Creatinine 0.9 mg/dL      Glucose 217 mg/dL      Calcium 9.3 mg/dL      eGFR >60.0 mL/min/1.73m*2      Comment: Calculation based on the Chronic Kidney Disease Epidemiology Collaboration (CKD-EPI) equation refit without adjustment for race.        Anion Gap 16 mEQ/L     Phosphorus [[id number]]  (Normal) Collected: 05/16/25 1221    Specimen: Blood, Venous Updated: 05/16/25 1306     Phosphorus 3.1 mg/dL     Magnesium [[id number]]  (Normal) Collected: 05/16/25 1221    Specimen: Blood, Venous Updated: 05/16/25 1306     Magnesium 1.8 mg/dL     Hemoglobin A1c [[id number]]  (Abnormal) Collected: 05/16/25 0432    Specimen: Blood, Venous Updated: 05/16/25 1031     Hemoglobin A1C 8.3 %       Comment: In the absence of an established diagnosis of Diabetes Mellitus, HbA1c levels between 5.7% and 6.4% are indicative of increased risk for developing Diabetes(Pre-Diabetes). Levels of 6.5% or greater are diagnostic for Diabetes Mellitus.        Estimated Ave Glucose 192 mg/dL      Comment: Estimate of average glucose concentration continuously over 24 hours for previous 2 to 3 months(Per ADA Recommendation).       [place] Draw Panel [[id number]] Collected: 05/16/25 0432    Specimen: Blood, Venous Updated: 05/16/25 1001    Narrative:      The following orders were created for panel order [place] Draw Panel.  Procedure                               Abnormality         Status                     ---------                               -----------         ------                     RAINBOW LAVENDER[[id number]]                                 Final result               RAINBOW LT GREEN[[id number]]                                 In process                   Please view results for these tests on the individual orders.    RAINBOW LAVENDER [[id number]] Collected: 05/16/25 0432    Specimen: Blood, Venous Updated: 05/16/25 1001    Basic metabolic panel [[id number]]  (Abnormal) Collected: 05/16/25 0847    Specimen: Blood, Venous Updated: 05/16/25 0949     Sodium 134 mEQ/L      Potassium 5.0 mEQ/L      Chloride 101 mEQ/L      CO2 11 mEQ/L      BUN 9 mg/dL      Creatinine 1.0 mg/dL      Glucose 180 mg/dL      Calcium 9.8 mg/dL      eGFR >60.0 mL/min/1.73m*2      Comment: Calculation based on the Chronic Kidney Disease Epidemiology Collaboration (CKD-EPI) equation refit without adjustment for race.        Anion Gap 22 mEQ/L     Phosphorus [[id number]]  (Normal) Collected: 05/16/25 0847    Specimen: Blood, Venous Updated: 05/16/25 0949     Phosphorus 3.6 mg/dL     Magnesium [[id number]]  (Normal) Collected: 05/16/25 0847    Specimen: Blood, Venous Updated: 05/16/25 0949     Magnesium 2.0 mg/dL     Beta Hydroxybutyrate [[id number]]   (Abnormal) Collected: 05/16/25 0618    Specimen: Blood, Venous Updated: 05/16/25 0932     Beta-Hydroxybutyrate 8.58 mmol/L      Comment: Checked by dilution         Beta Hydroxybutyrate [[id number]]  (Abnormal) Collected: 05/16/25 0432    Specimen: Blood, Venous Updated: 05/16/25 0535     Beta-Hydroxybutyrate 8.94 mmol/L     Comprehensive metabolic panel [[id number]]  (Abnormal) Collected: 05/16/25 0432    Specimen: Blood, Venous Updated: 05/16/25 0525     Sodium 132 mEQ/L      Potassium 5.0 mEQ/L      Comment: Results obtained on plasma. Plasma Potassium values may be up to 0.4 mEQ/L less than serum values. The differences may be greater for patients with high platelet or white cell counts.        Chloride 95 mEQ/L      CO2 9 mEQ/L      Comment: Result rechecked              BUN 10 mg/dL      Creatinine 1.2 mg/dL      Glucose 326 mg/dL      Calcium 10.8 mg/dL      AST (SGOT) 16 IU/L      ALT (SGPT) 19 IU/L      Alkaline Phosphatase 106 IU/L      Total Protein 9.4 g/dL      Comment: Test performed on plasma which typically contains approximately 0.4 g/dL more protein than serum.        Albumin 5.1 g/dL      Bilirubin, Total 0.5 mg/dL      eGFR >60.0 mL/min/1.73m*2      Comment: Calculation based on the Chronic Kidney Disease Epidemiology Collaboration (CKD-EPI) equation refit without adjustment for race.        Anion Gap 28 mEQ/L     Magnesium [[id number]]  (Normal) Collected: 05/16/25 0432    Specimen: Blood, Venous Updated: 05/16/25 0525     Magnesium 1.8 mg/dL     VBG Comprehensive [[id number]]  (Abnormal) Collected: 05/16/25 0452    Specimen: Blood, Venous Updated: 05/16/25 0518     pH, Venous 7.13     pCO2, Venous 23 mm Hg      pO2, Venous 47 mm Hg      HCO3, Venous 9.0 mEQ/L      Base Excess, Venous -19.6 mEQ/L      Oxygen Saturation, Venous 80 %      TCO2, Venous 8.4 mEQ/L      Lactate 2.4 mmol/L      Glucose, Venous 386 mg/dL      Sodium Venous 127 mEQ/L      Potassium, Venous 5.3 mEQ/L      Chloride, Venous 97  mEQ/L      Ionized Calcium, Venous 1.26 mmol/L      Hemoglobin, Venous 17.3 g/dL      Carboxyhemoglobin 1.3 %      Methemoglobin 0.9 %      Source Of Oxygen RA     FIO2 RA    CBC and differential [[id number]]  (Abnormal) Collected: 05/16/25 0432    Specimen: Blood, Venous Updated: 05/16/25 0512     WBC 23.04 K/uL      RBC 5.41 M/uL      Hemoglobin 16.4 g/dL      Hematocrit 47.1 %      MCV 87.1 fL      MCH 30.3 pg      MCHC 34.8 g/dL      RDW 12.0 %      Platelets 365 K/uL      MPV 11.5 fL      Differential Type Manu     Neutrophils 85 %      Lymphocytes 7 %      Monocytes 6 %      Eosinophils 2 %      Basophils 0 %      Neutrophils, Absolute 19.58 K/uL      Lymphocytes, Absolute 1.61 K/uL      Monocytes, Absolute 1.38 K/uL      Eosinophils, Absolute 0.46 K/uL      Basophils, Absolute 0.00 K/uL      RBC Morphology Normal     Platelet Estimate Adequate (150,000-400,000)     Clumped Platelets Present    Lipase [[id number]]  (Abnormal) Collected: 05/16/25 0432    Specimen: Blood, Venous Updated: 05/16/25 0507     Lipase 7 U/L     Lactate, w/ reflex repeat if > 2.0 [[id number]]  (Abnormal) Collected: 05/16/25 0452    Specimen: Blood, Venous Updated: 05/16/25 0504     Lactate 2.4 mmol/L     RAINBOW LT GREEN [[id number]] Collected: 05/16/25 0432    Specimen: Blood, Venous Updated: 05/16/25 0437            Imaging Results              X-RAY CHEST 1 VIEW (Final result)  Result time 05/16/25 10:17:29      Final result                   Impression:    IMPRESSION:  There is airspace opacity in the retrocardiac and left lower lobe region that  could represent atelectasis and/or infiltrate. Follow-up chest radiograph is  recommended to document complete resolution.                           Narrative:    CLINICAL HISTORY: Altered mental status    COMPARISON: None    COMMENT:    TECHNIQUE: Single frontal view of the chest was performed.    LUNGS AND PLEURA: There is subtle airspace opacity noted in the left lower lobe  and  retrocardiac region that could represent atelectasis and/or infiltrate.  Remaining lung fields are clear. No pleural effusion. No pneumothorax.    CARDIOMEDIASTINUM: Cardiomediastinal silhouette appears unchanged.    SKELETON/OTHER: No acute osseous abnormality.                                      ECG 12 lead          Scoring tools                                  ED Course & MDM   MDM / ED COURSE / CLINICAL IMPRESSION / DISPO     Medical Decision Making  Amount and/or Complexity of Data Reviewed  Labs: ordered. Decision-making details documented in ED Course.  Radiology: ordered.  ECG/medicine tests: ordered.    Risk  Prescription drug management.  Decision regarding hospitalization.        ED Course as of 05/17/25 2129   Fri May 16, 2025   0511 ECG as interpreted by ED physician:  Sinus tachycardia at 113 bpm, normal axis, no acute ischemic changes, QTc 463. [NAVI]   0516 WBC(!): 23.04 [CK]   0516 Lactate(!): 2.4  Fluids started [CK]   0525 CO2(!!): 9 [CK]   0525 Anion Gap(!): 28 [CK]   0541 Discussed patient with ICU and they agree with admission [CK]   Sat May 17, 2025   0629 CRITICAL CARE NOTE:     I have personally provided 37 minutes of critical care time exclusive of time spent on separately billable procedures.     Time includes review of all data, discussion with consultants / admitting physicians, re-evaluation of patient, discussion with patient and/or family, review of all results, and monitoring for potential decompensation.   [NAVI]      ED Course User Index  [CK] Nazia Fernandes PA C  [NAVI] Serg Ann,      Clinical Impression      Diabetic ketoacidosis without coma associated with type 1 diabetes mellitus (CMS/[facility])     _________________       ED Disposition   Admit / Observation                       Nazia Fernandes PA C  05/17/25 2129

## 2025-05-16 NOTE — ASSESSMENT & PLAN NOTE
Likely 2nd to dka, also elevated lactate  Improving     - Continue IVF resuscitation  - Follow chemistries per DKA protocol  - Follow venous blood gas

## 2025-05-16 NOTE — H&P
History & Physical    Subjective/Objective:  Admitting diagnosis: Diabetic ketoacidosis without coma associated with type 1 diabetes mellitus (CMS/HCC) [E10.10]  Patient is a 19 y.o. male who presents with nausea and vomiting.      Levi Joya is a 19 y.o. male w/ PMHx of DM1 who presents to the ED c/o nausea vomiting, diffuse abdominal pain which began yesterday.  Since onset, pt with multiple episodes of emesis, described as bilious.  He had abdominal pain, described as crampy.   Patient states his Dexcom fell off at home last insulin 7 units this morning, reports blood sugar in the 300s.   Reported hectic week with having finals at college, then flying down here to see sister graduating.  No previous episode of DKA. Denies fever, no diarrhea or constipation, no chest pain, no shortness of breath, no other complaints.      In ED, the patient was found to have hyperglycemia with BS 300s, elevated beta hydroxybutyrate, anion gap metabolic acidosis  Was ordered ivfs with BS, and insulin drip protocol    Upon my arrival to see pt in ed 108, sleeping but arousable.   Feels unwell, has nausea, abdominal pain.  No chest pain, sob, dizziness, LH currently.  No other new complaints.   Symptoms not worsening in severity since arrival.    Father at bedside updated, questions and concerns addressed.    Medical History:   Past Medical History:   Diagnosis Date    Celiac disease     Diabetes mellitus type I (CMS/HCC)     Epilepsy (CMS/HCC)        Surgical History: No past surgical history on file.    Social History     Substance and Sexual Activity   Drug Use Never     Tobacco Use: Low Risk  (5/16/2025)    Patient History     Smoking Tobacco Use: Never     Smokeless Tobacco Use: Never     Passive Exposure: Not on file     Alcohol Use: Not on file     Within the past week have you used heroin or used opioid medications other than as prescribed? (OxyContin, Fentanyl, Subutex): No  Do you get sick if you cannot use heroin,  methadone, or opioid medications?: No        Family History: No family history on file.    Allergies: Patient has no known allergies.    Current Medications[1]    Review of Systems  All other systems reviewed and negative except as noted in the HPI.    Vital Signs for the last 24 hours:  Temp:  [36.6 °C (97.8 °F)] 36.6 °C (97.8 °F)  Heart Rate:  [133] 133  Resp:  [19] 19  BP: (134)/(98) 134/98    Gen: ill appearing, wdwn  Heent: mm dry anicteric  Neck supple  Cv: tachycardic s1s2  Lung: cta  Abd: soft non distended non tender  Ext: no edema  Neuro: non focal  Skin: warm and dry.      Labs  I have reviewed the patient's pertinent labs.  Significant abnormals are  .  Hyperglycemia  Hyponatremia  Anion gap metabolic acidosis  Elevated beta hydroxybutyrate  Leukocytosis  Elevated lactate    Imaging  Significant findings include:   Chest xray independently reviewed by me: no acute disease.    ECG/Telemetry  I have independently reviewed the telemetry. Significant findings include sinus tachcyardia.  EKG reviewed: sinus tach 113 qtc 463  VTE Assessment: Padua             Assessment/Plan:  Assessment & Plan  Diabetic ketoacidosis without coma associated with type 1 diabetes mellitus (CMS/HCC)  Pt with hyperglycemia, anion gap metabolic acidosis, elevated beta hydroxybutyrate, likely dka  Admit to icu  Follow vitals per protocol  Insulin drip protocol initiated,   Follow frequent BS, chemistries for resolution of anion gap  Continue ivfs  Consult endocrine.  Hyponatremia  Likely 2nd to hypovolemia, hyperglycemia  Continue ivfs with NS  Change to dextrose containing solution when BS improves.    Metabolic acidosis  Likely 2nd to dka, also elevated lactate  Continue ivf resuscitation  Follow chemistries per dka protocol  Follow venous blood gas   Leukocytosis  Suspect reactive 2nd to dka, but will check ct a/p to rule out acute intra abdominal condition/process.  Low threshold to initiate antibiotics if evidence of active  infectious condition        Father was updated regarding results/findings, management plan  Questions and concerns addressed    Total critical care time: 35 minutes.  Code Status: Full Code  Estimated discharge date: 5/21/2025            [1]   Current Inpatient Medications   Medication Dose Route Frequency Provider Last Rate Last Admin    glucose chewable tablet 15-30 g of dextrose  15-30 g of dextrose oral PRN Nazia Fernandes PA C        Or    dextrose 40 % oral gel 15-30 g of dextrose  15-30 g of dextrose oral PRN Nazia Fernandes PA C        Or    glucagon (GLUCAGEN) injection 1 mg  1 mg intramuscular PRN Nazia Fernandes PA C        Or    dextrose 50 % in water (D50) injection 12.5 g  25 mL intravenous PRN Nazia Fernandes PA C        [Provider Managed Hold] insulin lispro U-100 (HumaLOG) pen 3-5 Units  3-5 Units subcutaneous With meals & nightly Nazia Fernandes PA C        insulin regular 100 unit/100 mL (1 unit/mL) infusion in NSS  0-24 Units/hr intravenous Continuous Nazia Fernandes PA C        insulin regular bolus from bag 7.5 Units  0.1 Units/kg intravenous Once Nazia Fernandes PA C        ondansetron (ZOFRAN) injection 4 mg  4 mg intravenous q6h PRN Maira Asher MD        sodium chloride 0.9 % infusion   intravenous Continuous Maira Asher MD

## 2025-05-16 NOTE — ASSESSMENT & PLAN NOTE
CT abd/pelvis shows pneumomediastinum as well as left lower lobe lung consolidation  Concern for esophageal tear in setting of multiple days of wretching/emesis   ?superimposed Aspiration PNA     - SpO2 goal > 92%  - CXR daily  - thoracic surgery consult  - fluoroscopy barium swallow, no obvious evidence of esophageal leak but redemonstrates of pneumomediastinum and subcutaneous air in neck and soft tissues   - keep NPO for now   - low threshold to get stat CT esophagram with new fever, significant increase in WBC, or other signs of developing infection  - will start empiric broad spectrum abx coverage  - ID now following

## 2025-05-16 NOTE — ASSESSMENT & PLAN NOTE
Suspect reactive 2nd to dka, but will check ct a/p to rule out acute intra abdominal condition/process.  Low threshold to initiate antibiotics if evidence of active infectious condition

## 2025-05-16 NOTE — SUBJECTIVE & OBJECTIVE
Admitting diagnosis: Diabetic ketoacidosis without coma associated with type 1 diabetes mellitus (CMS/HCC) [E10.10]  Patient is a 19 y.o. male who presents with nausea and vomiting.      Levi Joya is a 19 y.o. male w/ PMHx of DM1 who presents to the ED c/o nausea vomiting, diffuse abdominal pain which began yesterday.  Since onset, pt with multiple episodes of emesis, described as bilious.  He had abdominal pain, described as crampy.   Patient states his Dexcom fell off at home last insulin 7 units this morning, reports blood sugar in the 300s.   Reported hectic week with having finals at college, then flying down here to see sister graduating.  No previous episode of DKA. Denies fever, no diarrhea or constipation, no chest pain, no shortness of breath, no other complaints.      In ED, the patient was found to have hyperglycemia with BS 300s, elevated beta hydroxybutyrate, anion gap metabolic acidosis  Was ordered ivfs with BS, and insulin drip protocol    Upon my arrival to see pt in ed 108, sleeping but arousable.   Feels unwell, has nausea, abdominal pain.  No chest pain, sob, dizziness, LH currently.  No other new complaints.   Symptoms not worsening in severity since arrival.    Father at bedside updated, questions and concerns addressed.    Medical History:   Past Medical History:   Diagnosis Date    Celiac disease     Diabetes mellitus type I (CMS/HCC)     Epilepsy (CMS/[facility])        Surgical History: No past surgical history on file.    Social History     Substance and Sexual Activity   Drug Use Never     Tobacco Use: Low Risk  (5/16/2025)    Patient History     Smoking Tobacco Use: Never     Smokeless Tobacco Use: Never     Passive Exposure: Not on file     Alcohol Use: Not on file     Within the past week have you used heroin or used opioid medications other than as prescribed? (OxyContin, Fentanyl, Subutex): No  Do you get sick if you cannot use heroin, methadone, or opioid medications?:  No        Family History: No family history on file.    Allergies: Patient has no known allergies.    Current Medications[1]    Review of Systems  All other systems reviewed and negative except as noted in the HPI.    Vital Signs for the last 24 hours:  Temp:  [36.6 °C (97.8 °F)] 36.6 °C (97.8 °F)  Heart Rate:  [133] 133  Resp:  [19] 19  BP: (134)/(98) 134/98    Gen: ill appearing, wdwn  Heent: mm dry anicteric  Neck supple  Cv: tachycardic s1s2  Lung: cta  Abd: soft non distended non tender  Ext: no edema  Neuro: non focal  Skin: warm and dry.      Labs  I have reviewed the patient's pertinent labs.  Significant abnormals are  .  Hyperglycemia  Hyponatremia  Anion gap metabolic acidosis  Elevated beta hydroxybutyrate  Leukocytosis  Elevated lactate    Imaging  Significant findings include:   Chest xray independently reviewed by me: no acute disease.    ECG/Telemetry  I have independently reviewed the telemetry. Significant findings include sinus tachcyardia.  EKG reviewed: sinus tach 113 qtc 463  VTE Assessment: Padua              [1]   Current Inpatient Medications   Medication Dose Route Frequency Provider Last Rate Last Admin    glucose chewable tablet 15-30 g of dextrose  15-30 g of dextrose oral PRN Nazia Fernandes PA C        Or    dextrose 40 % oral gel 15-30 g of dextrose  15-30 g of dextrose oral PRN Nazia Fernandes PA C        Or    glucagon (GLUCAGEN) injection 1 mg  1 mg intramuscular PRN Nazia Fernandes PA C        Or    dextrose 50 % in water (D50) injection 12.5 g  25 mL intravenous PRN Nazia Fernandes PA C        [Provider Managed Hold] insulin lispro U-100 (HumaLOG) pen 3-5 Units  3-5 Units subcutaneous With meals & nightly Nazia Fernandes PA C        insulin regular 100 unit/100 mL (1 unit/mL) infusion in NSS  0-24 Units/hr intravenous Continuous Nazia Fernandes PA C        insulin regular bolus from bag 7.5 Units  0.1 Units/kg intravenous Once Nazia Fernandes PA C         ondansetron (ZOFRAN) injection 4 mg  4 mg intravenous q6h PRN Maira Asher MD        sodium chloride 0.9 % infusion   intravenous Continuous Maira Asher MD

## 2025-05-16 NOTE — CONSULTS
Infectious Disease Consult Note    Patient Name: Levi Joya  MR#: [id number]  : 2005  Admission Date: 2025  Consult Date: 25 3:29 PM   Consultant: Jack Meraz MD    Reason for Consult: possible pneumonia, here with DKA. concerns for perforated esophagus  Referring Provider:  Prince Levi Joya is a 19 y.o. male who was admitted on 2025.  This is a patient with a history of type 1 diabetes who was admitted for DKA.  His Dexcom fell off and he presented with intractable vomiting substernal chest pain radiating to the neck shortness of breath and cough.  Vital signs show no fever but he was tachycardic and tachypneic  Blood gas personally interpreted with metabolic acidosis 7 point /47/9 with a high beta hydroxybutyrate of almost 9 and a white count of 23,000 the creatinine was 1.2.  I personally reviewed the CT of the chest and the chest x-ray which are concerning for pneumomediastinum as well as nodular opacities of the left lower lobe the patient feels better now that he has been fluid resuscitated    Allergies: No Known Allergies    Medical History:   Past Medical History:   Diagnosis Date    Celiac disease     Diabetes mellitus type I (CMS/HCC)     Epilepsy (CMS/[facility])        Surgical History: No past surgical history on file.    Social History     Tobacco Use    Smoking status: Never    Smokeless tobacco: Never   Vaping Use    Vaping status: Never Used   Substance Use Topics    Alcohol use: Never    Drug use: Never       Family History: No family history on file.    Review of Systems    All other systems reviewed and negative except as noted in the HPI.    Medications:    Current IP Meds (From admission, onward)          Frequency     magnesium sulfate IVPB 2g in 50 mL NSS/D5W/SWFI         Once     iopamidoL (ISOVUE-370) 370 mg iodine /mL (76 %) injection 100 mL  (Risk of aspiration/perforation)         Once in imaging     cefTRIAXone (ROCEPHIN) IVPB 1 g in  "100 mL NSS vial in bag         Every 24 hours interval     metroNIDAZOLE in NaCl (iso-os) (FLAGYL) IVPB 500 mg         Every 8 hours interval     fluconazole in NaCl (iso-osm) (DIFLUCAN) IVPB 200 mg         Every 24 hours interval     morphine injection 2 mg         Once     acetaminophen (OFIRMEV) injection 1,000 mg        Note to Pharmacy:  Pharmacist: [facility] is the depot location for this medication    Every 6 hours PRN     D5 % and 0.45 % sodium chloride infusion         Continuous     [Provider Managed Hold]  insulin lispro U-100 (HumaLOG) pen 3-5 Units  (Hyperglycemia, DKA and HHS Quick Panel)        (On hold since today at 0559 until manually unheld; held by Maira Asher MDHold Reason: Awaiting Consultant inputHold Comment: Npo, dka)   On hold since today at 0559 until manually unheld   Hold reason: Awaiting Consultant input, Hold comment: Npo, dka    4 times daily with meals and nightly     ondansetron (ZOFRAN) injection 4 mg         Every 6 hours PRN     sodium chloride 0.9 % infusion  Status:  Discontinued         Continuous     sodium chloride 0.9 % infusion  (Hyperglycemia, DKA and HHS Quick Panel)  Status:  Discontinued         Continuous     insulin regular bolus from bag 7.5 Units  (Hyperglycemia, DKA and HHS Quick Panel)         Once     insulin regular 100 unit/100 mL (1 unit/mL) infusion in NSS  (Hyperglycemia, DKA and HHS Quick Panel)         Continuous     glucose chewable tablet 15-30 g of dextrose  (Hyperglycemia, DKA and HHS Quick Panel)  Status:  Discontinued        Placed in \"Or\" Linked Group    As needed     dextrose 40 % oral gel 15-30 g of dextrose  (Hyperglycemia, DKA and HHS Quick Panel)  Status:  Discontinued        Placed in \"Or\" Linked Group    As needed     glucagon (GLUCAGEN) injection 1 mg  (Hyperglycemia, DKA and HHS Quick Panel)  Status:  Discontinued        Placed in \"Or\" Linked Group    As needed     dextrose 50 % in water (D50) injection 12.5 g  (Hyperglycemia, DKA and HHS " "Quick Panel)  Status:  Discontinued        Placed in \"Or\" Linked Group    As needed     glucose chewable tablet 15-30 g of dextrose  (Hyperglycemia, DKA and HHS Quick Panel)        Placed in \"Or\" Linked Group    As needed     dextrose 40 % oral gel 15-30 g of dextrose  (Hyperglycemia, DKA and HHS Quick Panel)        Placed in \"Or\" Linked Group    As needed     glucagon (GLUCAGEN) injection 1 mg  (Hyperglycemia, DKA and HHS Quick Panel)        Placed in \"Or\" Linked Group    As needed     dextrose 50 % in water (D50) injection 12.5 g  (Hyperglycemia, DKA and HHS Quick Panel)        Placed in \"Or\" Linked Group    As needed     ondansetron (ZOFRAN) injection 4 mg         Once     sodium chloride 0.9 % bolus 1,000 mL         Once            Anti-infectives (From admission, onward)      Start     Dose/Rate Route Frequency Ordered Stop    05/16/25 1230  cefTRIAXone (ROCEPHIN) IVPB 1 g in 100 mL NSS vial in bag         1 g  200 mL/hr over 30 Minutes intravenous Every 24 hours interval 05/16/25 1136      05/16/25 1230  metroNIDAZOLE in NaCl (iso-os) (FLAGYL) IVPB 500 mg         500 mg  100 mL/hr over 60 Minutes intravenous Every 8 hours interval 05/16/25 1136      05/16/25 1230  fluconazole in NaCl (iso-osm) (DIFLUCAN) IVPB 200 mg         200 mg  100 mL/hr over 60 Minutes intravenous Every 24 hours interval 05/16/25 1136            Total critical care time: Approximately 36 minutes  Due to a high probability of clinically significant, life threatening deterioration, the patient required my highest level of preparedness to intervene emergently and I personally spent this critical care time directly and personally managing the patient. This critical care time included obtaining a history; examining the patient; pulse oximetry; ordering and review of studies; arranging urgent treatment with development of a management plan; evaluation of patient's response to treatment; frequent reassessment; and, discussions with other " providers.  This critical care time was performed to assess and manage the high probability of imminent, life-threatening deterioration that could result in multi-organ failure. It was exclusive of separately billable procedures and treating other patients and teaching time.  I personally interpreted information related to cardiac output, chest x-rays and CT scans, pulse oximetry, blood gases        Vital Signs:    Temp:  [36.6 °C (97.8 °F)-36.8 °C (98.3 °F)] 36.8 °C (98.3 °F)  Heart Rate:  [] 102  Resp:  [19-31] 27  BP: (104-138)/(58-98) 123/72    Temp (72hrs), Av.7 °C (98 °F), Min:36.6 °C (97.8 °F), Max:36.8 °C (98.3 °F)      Physical Exam     Gen: Aox3  HEENT: OP clear  Neck: Supple  LAD: No cervical LAD  Lungs: Crackles  CV: RRR no murmurs  Abd: Soft NTND +BS  Ext: No c/c/e  Skin: Crepitus in neck  Neuro: II-XII intact        Lines, Drains, Airways, Wounds:  Peripheral IV (Adult) 25 Right Antecubital (Active)   Number of days: 0       Labs:    Lab Results   Component Value Date    WBC 23.04 (H) 2025    HGB 16.4 2025    HCT 47.1 2025    MCV 87.1 2025     (H) 2025     Lab Results   Component Value Date    GLUCOSE 217 (H) 2025    CALCIUM 9.3 2025     (L) 2025    K 4.3 2025    CO2 14 (L) 2025     2025    BUN 7 2025    CREATININE 0.9 2025     Lab Results   Component Value Date    ALT 19 2025    AST 16 2025    ALKPHOS 106 2025    BILITOT 0.5 2025     UA Results    No lab values to display.       Microbiology Results       Procedure Component Value Units Date/Time    MRSA Screen, Nares Only Nose [[id number]]  (Normal) Collected: 25 0840    Specimen: Nasal Swab from Nose Updated: 25 1336     MRSA DNA, Nares Not Detected             Pathology Results       ** No results found for the last 720 hours. **            Echo:         Imaging:    Radiology Imaging    XR CHEST 1  VW    Narrative  CLINICAL HISTORY: Altered mental status    COMPARISON: None    COMMENT:    TECHNIQUE: Single frontal view of the chest was performed.    LUNGS AND PLEURA: There is subtle airspace opacity noted in the left lower lobe  and retrocardiac region that could represent atelectasis and/or infiltrate.  Remaining lung fields are clear. No pleural effusion. No pneumothorax.    CARDIOMEDIASTINUM: Cardiomediastinal silhouette appears unchanged.    SKELETON/OTHER: No acute osseous abnormality.    Impression  IMPRESSION:  There is airspace opacity in the retrocardiac and left lower lobe region that  could represent atelectasis and/or infiltrate. Follow-up chest radiograph is  recommended to document complete resolution.      Patient Active Problem List   Diagnosis Code    Diabetic ketoacidosis without coma associated with type 1 diabetes mellitus (CMS/HCC) E10.10    Hyponatremia E87.1    Metabolic acidosis E87.20    Leukocytosis D72.829    Aspiration pneumonia (CMS/HCC) J69.0    Pneumomediastinum (CMS/HCC) J98.2     Assessment & Plan  Aspiration pneumonia (CMS/HCC)  Possible aspiration pneumonia  Rocephin 1 g daily  Metronidazole 500 3 times daily  Recommend 5 days of treatment which can be switched to oral once the patient is cleared by the surgical team  Pneumomediastinum (CMS/HCC)  To prevent mediastinitis continue with antibiotics as above  The patient is also on fluconazole which is typically not necessary unless the surgical team specifically requested it  Diabetic ketoacidosis without coma associated with type 1 diabetes mellitus (CMS/HCC)  Continue with fluid electrolyte and insulin replacement per endocrinology and ICU team    Jack Meraz MD  5/16/2025 3:29 PM

## 2025-05-17 ENCOUNTER — APPOINTMENT (INPATIENT)
Dept: RADIOLOGY | Facility: HOSPITAL | Age: 20
DRG: 637 | End: 2025-05-17
Payer: COMMERCIAL

## 2025-05-17 LAB
ANION GAP SERPL CALC-SCNC: 11 MEQ/L (ref 3–15)
ANION GAP SERPL CALC-SCNC: 12 MEQ/L (ref 3–15)
BUN SERPL-MCNC: 4 MG/DL (ref 7–25)
BUN SERPL-MCNC: 5 MG/DL (ref 7–25)
CALCIUM SERPL-MCNC: 9.1 MG/DL (ref 8.6–10.3)
CALCIUM SERPL-MCNC: 9.5 MG/DL (ref 8.6–10.3)
CHLORIDE SERPL-SCNC: 104 MEQ/L (ref 98–107)
CHLORIDE SERPL-SCNC: 104 MEQ/L (ref 98–107)
CO2 SERPL-SCNC: 21 MEQ/L (ref 21–31)
CO2 SERPL-SCNC: 22 MEQ/L (ref 21–31)
CREAT SERPL-MCNC: 0.7 MG/DL (ref 0.7–1.3)
CREAT SERPL-MCNC: 0.8 MG/DL (ref 0.7–1.3)
EGFRCR SERPLBLD CKD-EPI 2021: >60 ML/MIN/1.73M*2
EGFRCR SERPLBLD CKD-EPI 2021: >60 ML/MIN/1.73M*2
ERYTHROCYTE [DISTWIDTH] IN BLOOD BY AUTOMATED COUNT: 11.9 % (ref 11.6–14.4)
GLUCOSE BLD-MCNC: 113 MG/DL (ref 70–99)
GLUCOSE BLD-MCNC: 115 MG/DL (ref 70–99)
GLUCOSE BLD-MCNC: 116 MG/DL (ref 70–99)
GLUCOSE BLD-MCNC: 117 MG/DL (ref 70–99)
GLUCOSE BLD-MCNC: 129 MG/DL (ref 70–99)
GLUCOSE BLD-MCNC: 140 MG/DL (ref 70–99)
GLUCOSE BLD-MCNC: 154 MG/DL (ref 70–99)
GLUCOSE BLD-MCNC: 161 MG/DL (ref 70–99)
GLUCOSE BLD-MCNC: 167 MG/DL (ref 70–99)
GLUCOSE BLD-MCNC: 170 MG/DL (ref 70–99)
GLUCOSE BLD-MCNC: 172 MG/DL (ref 70–99)
GLUCOSE BLD-MCNC: 179 MG/DL (ref 70–99)
GLUCOSE BLD-MCNC: 179 MG/DL (ref 70–99)
GLUCOSE BLD-MCNC: 186 MG/DL (ref 70–99)
GLUCOSE BLD-MCNC: 193 MG/DL (ref 70–99)
GLUCOSE BLD-MCNC: 193 MG/DL (ref 70–99)
GLUCOSE BLD-MCNC: 194 MG/DL (ref 70–99)
GLUCOSE BLD-MCNC: 194 MG/DL (ref 70–99)
GLUCOSE BLD-MCNC: 197 MG/DL (ref 70–99)
GLUCOSE BLD-MCNC: 198 MG/DL (ref 70–99)
GLUCOSE BLD-MCNC: 209 MG/DL (ref 70–99)
GLUCOSE BLD-MCNC: 210 MG/DL (ref 70–99)
GLUCOSE BLD-MCNC: 228 MG/DL (ref 70–99)
GLUCOSE BLD-MCNC: 71 MG/DL (ref 70–99)
GLUCOSE BLD-MCNC: 89 MG/DL (ref 70–99)
GLUCOSE SERPL-MCNC: 162 MG/DL (ref 70–99)
GLUCOSE SERPL-MCNC: 224 MG/DL (ref 70–99)
HCT VFR BLD AUTO: 40.7 % (ref 40.1–51)
HGB BLD-MCNC: 14.3 G/DL (ref 13.7–17.5)
MAGNESIUM SERPL-MCNC: 1.7 MG/DL (ref 1.8–2.5)
MAGNESIUM SERPL-MCNC: 1.9 MG/DL (ref 1.8–2.5)
MCH RBC QN AUTO: 30.4 PG (ref 28–33.2)
MCHC RBC AUTO-ENTMCNC: 35.1 G/DL (ref 32.2–36.5)
MCV RBC AUTO: 86.4 FL (ref 83–98)
PHOSPHATE SERPL-MCNC: 2.3 MG/DL (ref 2.4–4.7)
PHOSPHATE SERPL-MCNC: 3.4 MG/DL (ref 2.4–4.7)
PLATELET # BLD AUTO: 252 K/UL (ref 150–350)
PMV BLD AUTO: 10.9 FL (ref 9.4–12.4)
POCT TEST: ABNORMAL
POCT TEST: NORMAL
POCT TEST: NORMAL
POTASSIUM SERPL-SCNC: 3.6 MEQ/L (ref 3.5–5.1)
POTASSIUM SERPL-SCNC: 3.7 MEQ/L (ref 3.5–5.1)
RBC # BLD AUTO: 4.71 M/UL (ref 4.5–5.8)
SODIUM SERPL-SCNC: 136 MEQ/L (ref 136–145)
SODIUM SERPL-SCNC: 138 MEQ/L (ref 136–145)
WBC # BLD AUTO: 9.24 K/UL (ref 3.8–10.5)

## 2025-05-17 PROCEDURE — 25800000 HC PHARMACY IV SOLUTIONS: Performed by: HOSPITALIST

## 2025-05-17 PROCEDURE — 20000000 HC ROOM AND CARE ICU

## 2025-05-17 PROCEDURE — 25000000 HC PHARMACY GENERAL

## 2025-05-17 PROCEDURE — 25800000 HC PHARMACY IV SOLUTIONS: Performed by: INTERNAL MEDICINE

## 2025-05-17 PROCEDURE — 63600000 HC DRUGS/DETAIL CODE: Mod: JZ

## 2025-05-17 PROCEDURE — 25800000 HC PHARMACY IV SOLUTIONS

## 2025-05-17 PROCEDURE — 84100 ASSAY OF PHOSPHORUS: CPT | Performed by: INTERNAL MEDICINE

## 2025-05-17 PROCEDURE — 83735 ASSAY OF MAGNESIUM: CPT | Performed by: INTERNAL MEDICINE

## 2025-05-17 PROCEDURE — 82374 ASSAY BLOOD CARBON DIOXIDE: CPT | Performed by: INTERNAL MEDICINE

## 2025-05-17 PROCEDURE — 36415 COLL VENOUS BLD VENIPUNCTURE: CPT

## 2025-05-17 PROCEDURE — 80048 BASIC METABOLIC PNL TOTAL CA: CPT | Performed by: INTERNAL MEDICINE

## 2025-05-17 PROCEDURE — 85027 COMPLETE CBC AUTOMATED: CPT

## 2025-05-17 PROCEDURE — 71045 X-RAY EXAM CHEST 1 VIEW: CPT

## 2025-05-17 PROCEDURE — 200200 PR NO CHARGE: Performed by: THORACIC SURGERY (CARDIOTHORACIC VASCULAR SURGERY)

## 2025-05-17 PROCEDURE — 63700000 HC SELF-ADMINISTRABLE DRUG

## 2025-05-17 RX ORDER — DEXTROSE MONOHYDRATE AND SODIUM CHLORIDE 5; .45 G/100ML; G/100ML
INJECTION, SOLUTION INTRAVENOUS CONTINUOUS
Status: DISCONTINUED | OUTPATIENT
Start: 2025-05-17 | End: 2025-05-17

## 2025-05-17 RX ORDER — POTASSIUM CHLORIDE 14.9 MG/ML
20 INJECTION INTRAVENOUS ONCE
Status: COMPLETED | OUTPATIENT
Start: 2025-05-17 | End: 2025-05-17

## 2025-05-17 RX ORDER — DEXTROSE MONOHYDRATE AND SODIUM CHLORIDE 5; .45 G/100ML; G/100ML
INJECTION, SOLUTION INTRAVENOUS CONTINUOUS
Status: DISCONTINUED | OUTPATIENT
Start: 2025-05-17 | End: 2025-05-18

## 2025-05-17 RX ORDER — SODIUM CHLORIDE 9 MG/ML
INJECTION, SOLUTION INTRAVENOUS CONTINUOUS
Status: DISCONTINUED | OUTPATIENT
Start: 2025-05-17 | End: 2025-05-17

## 2025-05-17 RX ORDER — DEXTROSE 50 % IN WATER (D50W) INTRAVENOUS SYRINGE
10-30 AS NEEDED
Status: DISCONTINUED | OUTPATIENT
Start: 2025-05-17 | End: 2025-05-19

## 2025-05-17 RX ADMIN — INSULIN HUMAN 1.5 UNITS/HR: 1 INJECTION, SOLUTION INTRAVENOUS at 12:57

## 2025-05-17 RX ADMIN — SODIUM CHLORIDE: 9 INJECTION, SOLUTION INTRAVENOUS at 16:39

## 2025-05-17 RX ADMIN — METRONIDAZOLE 500 MG: 5 INJECTION, SOLUTION INTRAVENOUS at 12:06

## 2025-05-17 RX ADMIN — ENOXAPARIN SODIUM 40 MG: 40 INJECTION SUBCUTANEOUS at 18:23

## 2025-05-17 RX ADMIN — DEXTROSE MONOHYDRATE 5 G: 25 INJECTION, SOLUTION INTRAVENOUS at 20:06

## 2025-05-17 RX ADMIN — FLUCONAZOLE 200 MG: 2 INJECTION, SOLUTION INTRAVENOUS at 11:13

## 2025-05-17 RX ADMIN — DEXTROSE AND SODIUM CHLORIDE: 5; 450 INJECTION, SOLUTION INTRAVENOUS at 20:55

## 2025-05-17 RX ADMIN — DEXTROSE AND SODIUM CHLORIDE 150 ML/HR: 5; 450 INJECTION, SOLUTION INTRAVENOUS at 11:17

## 2025-05-17 RX ADMIN — METRONIDAZOLE 500 MG: 5 INJECTION, SOLUTION INTRAVENOUS at 04:04

## 2025-05-17 RX ADMIN — METRONIDAZOLE 500 MG: 5 INJECTION, SOLUTION INTRAVENOUS at 20:53

## 2025-05-17 RX ADMIN — DEXTROSE AND SODIUM CHLORIDE: 5; 450 INJECTION, SOLUTION INTRAVENOUS at 04:16

## 2025-05-17 RX ADMIN — CEFTRIAXONE SODIUM 1 G: 1 INJECTION, POWDER, FOR SOLUTION INTRAMUSCULAR; INTRAVENOUS at 13:34

## 2025-05-17 RX ADMIN — LAMOTRIGINE 125 MG: 100 TABLET ORAL at 20:53

## 2025-05-17 RX ADMIN — LAMOTRIGINE 125 MG: 100 TABLET ORAL at 10:11

## 2025-05-17 RX ADMIN — POTASSIUM CHLORIDE 20 MEQ: 200 INJECTION, SOLUTION INTRAVENOUS at 13:35

## 2025-05-17 RX ADMIN — MAGNESIUM SULFATE HEPTAHYDRATE 2 G: 40 INJECTION, SOLUTION INTRAVENOUS at 08:14

## 2025-05-17 RX ADMIN — MAGNESIUM SULFATE HEPTAHYDRATE 2 G: 40 INJECTION, SOLUTION INTRAVENOUS at 18:23

## 2025-05-17 RX ADMIN — POTASSIUM CHLORIDE 20 MEQ: 200 INJECTION, SOLUTION INTRAVENOUS at 08:11

## 2025-05-17 ASSESSMENT — COGNITIVE AND FUNCTIONAL STATUS - GENERAL
CLIMB 3 TO 5 STEPS WITH RAILING: 4 - NONE
STANDING UP FROM CHAIR USING ARMS: 4 - NONE
STANDING UP FROM CHAIR USING ARMS: 4 - NONE
MOVING TO AND FROM BED TO CHAIR: 4 - NONE
WALKING IN HOSPITAL ROOM: 4 - NONE
CLIMB 3 TO 5 STEPS WITH RAILING: 4 - NONE
MOVING TO AND FROM BED TO CHAIR: 4 - NONE
WALKING IN HOSPITAL ROOM: 4 - NONE

## 2025-05-17 NOTE — PLAN OF CARE
Problem: Adult Inpatient Plan of Care  Goal: Plan of Care Review  Flowsheets (Taken 5/17/2025 1103)  Progress: no change  Outcome Evaluation: Pt remains NPO. Pt is currently without n,v.  Plan of Care Reviewed With:   patient   caregiver

## 2025-05-17 NOTE — ASSESSMENT & PLAN NOTE
Patient on insulin gtt at 3 units /hr. Bg under pretty good control. AG closed. Changed to software calculated protocol . Trying to eat. Had broth made his stomach hurt so taking it slow and hasn't eaten much . Will change IVF to nonD5 containing to keep requirements down since diet ordered. Advance as tolerated and as allowed. Will follow. Can bring in cgm for tomorrow. Continue insulin gtt until tolerating food.

## 2025-05-17 NOTE — PROGRESS NOTES
Endocrine Daily Progress Note    Pt feeling ok. Bg controlled. Had some broth but didn't tolerate too well. Made stomach hurt. Mom in room  Medications Ordered Prior to Encounter[1]  Pertinent radiology and labs reviewed  Vitals:    05/17/25 1400   BP: 123/81   Pulse: 83   Resp: (!) 21   Temp:    SpO2: 92%     Physical Exam  Constitutional:       Appearance: Normal appearance.   HENT:      Head: Atraumatic.   Eyes:      Extraocular Movements: Extraocular movements intact.   Pulmonary:      Effort: Pulmonary effort is normal.   Abdominal:      Palpations: Abdomen is soft.   Musculoskeletal:      Cervical back: Neck supple.      Right lower leg: No edema.      Left lower leg: No edema.   Neurological:      Mental Status: He is oriented to person, place, and time.         Assessment & Plan  Assessment & Plan  Diabetic ketoacidosis without coma associated with type 1 diabetes mellitus (CMS/[facility])  Patient on insulin gtt at 3 units /hr. Bg under pretty good control. AG closed. Changed to software calculated protocol . Trying to eat. Had broth made his stomach hurt so taking it slow and hasn't eaten much . Will change IVF to nonD5 containing to keep requirements down since diet ordered. Advance as tolerated and as allowed. Will follow. Can bring in cgm for tomorrow. Continue insulin gtt until tolerating food.         [1]   No current facility-administered medications on file prior to encounter.     Current Outpatient Medications on File Prior to Encounter   Medication Sig Dispense Refill    insulin lispro U-100 (HumaLOG) 100 unit/mL pen Inject under the skin 3 (three) times a day before meals.      lamoTRIgine (LaMICtal) 100 mg tablet Take 125 mg by mouth daily.

## 2025-05-17 NOTE — PROGRESS NOTES
Thoracic Surgery Daily Progress Note    Subjective     Interval History:   NAEON. Advanced to CLD yesterday. Tolerating new diet well. Denies chest pain, SOB, abdominal pain. No n/v. AFVSS. Endorses appetite.     Objective     Vital signs in last 24 hours:  Temp:  [36.5 °C (97.7 °F)-37.2 °C (98.9 °F)] 37 °C (98.6 °F)  Heart Rate:  [] 70  Resp:  [16-31] 24  BP: (114-133)/(61-93) 123/74      Intake/Output Summary (Last 24 hours) at 5/17/2025 1919  Last data filed at 5/17/2025 1823  Gross per 24 hour   Intake 4922.08 ml   Output 400 ml   Net 4522.08 ml     Intake/Output this shift:  No intake/output data recorded.    Physical Exam  General: well appearing, no acute distress   HEENT: EOMI, normocephalic, atraumatic, neck supple  Respiratory: normal effort, breathing comfortably on room air   Cardiovascular: regular rate  Chest: Non-tender   Abdomen: soft, NTND  Extremities: warm, well perfused  Neuro: grossly intact, AAOx3  Psych: normal affect        Labs  I have reviewed the patient's current labs.    Imaging  I have reviewed the imaging from the past 24 hours.      Assessment/Plan     19M with DKA and emesis, now with chest pain and pneumomediastinum on CT. C/f esophageal perforation     - maintain CLD today   - if febrile or decompensates please obtain CT esophagram  - Cont CTX/flagyl/fluconazole for esophageal perf coverage      Discussed with Dr. Brown    Pending-mother wants him advance to least some type of solid diet for sugar stabilization.  I spoke with Dr. Sepulveda who said soft mechanical diet is okay.  Sent a text to the resident.  Would have dietary give him a list of those foods.    Freddie Brown MD

## 2025-05-17 NOTE — CONSULTS
Nutrition Assessment/Consult    Goals:  Blood sugar less than 180mg/dl.  Consume % energy and protein needs via mouth once po diet advances.    Recommendation/Interventions:  Advance diet when medically able to NCS, 2200calorie, Gluten free  Offer oral nutrition supplements as needed.  Discussed with pt gluten free diet as well as diabetic diet and how to gain some weight without raising his blood sugars.  Attached diet handouts to d/c paperwork.  Monitor NPO status and labs, especially glucose.  Weekly weights.    Clinical comments:  Pt stated he does follow a diabetic diet and does monitor his blood sugars with a continuous glucose monitor. Pt stated he was recently diagnosed with Celiac dz and has been trying to avoid gluten. Pt stated he was not formally educated on gluten free diet but was having n,v prior to his fall semester when he was finally diagnosed with Celiac. Pt stated he has a cousin who also has Celiac dz and she is very sensitive to gluten. Advised pt to read food labels to ensure he is avoiding all products that contain wheat. Noted pt has been losing weight (was 158lbs in January). Pt stated he has not been working out for the past 3 weeks and he has had final exams at college. Pt also came in with n,v prior to admission which may be related to consuming gluten. Advised pt that weight loss may be a sign that he has been consuming gluten and he may need to be more diligent in his Gluten free diet.     Adult Nutrition Assessment - 05/17/25 1059          Charting Type    Nutrition Charting Type Nutrition Assessment     RD Assessment Date 05/17/25     Nutrition Status Classification Moderate nutritional compromise     RD Preferred Follow Up Date 5/20-5/22     Time Spent (Minutes) 60        Nutrition/Diet History    Diet Prior to Admission diabetic, gluten free     Appetite Prior to Admission Poor-0-25%     Intake (%) 0%     Food Allergies wheat     Factors Affecting Nutritional Intake  "vomiting;nausea        Anthropometrics    Height 1.803 m (5' 11\")        Current Weight    Weight 64.4 kg (142 lb)        Ideal Body Weight (IBW)    Ideal Body Weight (IBW) (kg) 79.27     % Ideal Body Weight 81.25        Usual Body Weight (UBW)    Usual Body Weight 71.7 kg (158 lb)     % Usual Body Weight 89.87     % of Usual Body Weight Assessment 85-95% - mild deficit     Weight Loss unintentional     Time Frame Other (comments)   4 months       Body Mass Index (BMI)    BMI (Calculated) 19.8     BMI Assessment BMI 18.5-24.9: normal     Nutritional Status/Malnutrition Does not meet criteria for malnutrition        Labs/Procedures/Meds    Lab Results Reviewed reviewed, pertinent     Lab Results Comments HbA1c 8.3, glu 162        Diagnostic Tests/Procedures    Diagnostic Test/Procedure Reviewed reviewed        Medications    Pertinent Medications Reviewed reviewed     Pertinent Medications Comments insulin drip        Physical Findings    Skin intact        Calorie Requirements    Estimated kCal Needs Actual Body Weight     Estimated Calorie Need Method kcal/kg     Calorie/kg Recommended 30-35     Calorie Recommendations 3486-5162        Protein Requirements    Recommended Dosing Weight (Estimated Protein Needs) Actual Body Weight     Est Protein Requirement Amount (gms/kg) 1.2-->1.2 gm protein   -1.8    Estimated Protein Requirements (gms/day) 77.29     Protein Recommendations         Fluid Requirements    Fluid Recommendation (mL) 25-30ml     Recommended Fluid Needs Dosing Weight Actual Body Weight     Fluid Requirements (mL/day) 8378-3680     Kings-Kenisha Method (over 20 kg) 2788.22        Nutrition Order    Nutrition Order does not meet nutritional requirements     Nutrition Order Comments NPO        PES Statement    Nutrition Diagnosis Inadequate Protein-Energy Intake     Related To: Decreased ability to consume sufficient energy     As Evidenced By: NPO, 0% intake     Nutritional Needs Met? No          "

## 2025-05-17 NOTE — PROGRESS NOTES
Thoracic Surgery Daily Progress Note    Subjective     Interval History:   NAEON. Remains NPO. Denies chest pain, SOB, abdominal pain. No n/v. AFVSS. Pt feels hungry    Objective     Vital signs in last 24 hours:  Temp:  [36.6 °C (97.8 °F)-36.8 °C (98.3 °F)] 36.7 °C (98 °F)  Heart Rate:  [] 84  Resp:  [19-31] 25  BP: (104-138)/(58-98) 131/75      Intake/Output Summary (Last 24 hours) at 5/16/2025 2257  Last data filed at 5/16/2025 2200  Gross per 24 hour   Intake 4053.18 ml   Output 725 ml   Net 3328.18 ml     Intake/Output this shift:  I/O this shift:  In: 816 [I.V.:616; IV Piggyback:200]  Out: -     Physical Exam  General: well appearing, no acute distress   HEENT: EOMI, normocephalic, atraumatic, neck supple  Respiratory: normal effort, breathing comfortably on room air   Cardiovascular: regular rate  Chest: Non-tender   Abdomen: soft, NTND  Extremities: warm, well perfused  Neuro: grossly intact, AAOx3  Psych: normal affect        Labs  I have reviewed the patient's current labs.    Imaging  I have reviewed the imaging from the past 24 hours.      Assessment/Plan     19M with DKA and emesis, now with chest pain and pneumomediastinum on CT. C/f esophageal perforation     - ok to advance diet to CLD if not vomiting or nausea  - if febrile or decompensates please obtain CT esophagram  - Cont CTX/flagyl/fluconazole for esophageal perf coverage    Expected Discharge Date:   5/21/2025        Isabel Nunez  Department of Surgery  PGY-3

## 2025-05-18 ENCOUNTER — APPOINTMENT (INPATIENT)
Dept: RADIOLOGY | Facility: HOSPITAL | Age: 20
DRG: 637 | End: 2025-05-18
Payer: COMMERCIAL

## 2025-05-18 LAB
ANION GAP SERPL CALC-SCNC: 8 MEQ/L (ref 3–15)
BUN SERPL-MCNC: 4 MG/DL (ref 7–25)
CALCIUM SERPL-MCNC: 9.1 MG/DL (ref 8.6–10.3)
CHLORIDE SERPL-SCNC: 108 MEQ/L (ref 98–107)
CO2 SERPL-SCNC: 25 MEQ/L (ref 21–31)
CREAT SERPL-MCNC: 0.7 MG/DL (ref 0.7–1.3)
EGFRCR SERPLBLD CKD-EPI 2021: >60 ML/MIN/1.73M*2
ERYTHROCYTE [DISTWIDTH] IN BLOOD BY AUTOMATED COUNT: 11.9 % (ref 11.6–14.4)
GLUCOSE BLD-MCNC: 101 MG/DL (ref 70–99)
GLUCOSE BLD-MCNC: 104 MG/DL (ref 70–99)
GLUCOSE BLD-MCNC: 106 MG/DL (ref 70–99)
GLUCOSE BLD-MCNC: 109 MG/DL (ref 70–99)
GLUCOSE BLD-MCNC: 113 MG/DL (ref 70–99)
GLUCOSE BLD-MCNC: 118 MG/DL (ref 70–99)
GLUCOSE BLD-MCNC: 125 MG/DL (ref 70–99)
GLUCOSE BLD-MCNC: 139 MG/DL (ref 70–99)
GLUCOSE BLD-MCNC: 144 MG/DL (ref 70–99)
GLUCOSE BLD-MCNC: 147 MG/DL (ref 70–99)
GLUCOSE BLD-MCNC: 169 MG/DL (ref 70–99)
GLUCOSE BLD-MCNC: 175 MG/DL (ref 70–99)
GLUCOSE BLD-MCNC: 179 MG/DL (ref 70–99)
GLUCOSE BLD-MCNC: 180 MG/DL (ref 70–99)
GLUCOSE BLD-MCNC: 181 MG/DL (ref 70–99)
GLUCOSE BLD-MCNC: 182 MG/DL (ref 70–99)
GLUCOSE BLD-MCNC: 184 MG/DL (ref 70–99)
GLUCOSE BLD-MCNC: 186 MG/DL (ref 70–99)
GLUCOSE BLD-MCNC: 192 MG/DL (ref 70–99)
GLUCOSE SERPL-MCNC: 103 MG/DL (ref 70–99)
HCT VFR BLD AUTO: 37 % (ref 40.1–51)
HGB BLD-MCNC: 13 G/DL (ref 13.7–17.5)
MAGNESIUM SERPL-MCNC: 1.8 MG/DL (ref 1.8–2.5)
MCH RBC QN AUTO: 30 PG (ref 28–33.2)
MCHC RBC AUTO-ENTMCNC: 35.1 G/DL (ref 32.2–36.5)
MCV RBC AUTO: 85.5 FL (ref 83–98)
PHOSPHATE SERPL-MCNC: 3.8 MG/DL (ref 2.4–4.7)
PLATELET # BLD AUTO: 243 K/UL (ref 150–350)
PMV BLD AUTO: 11 FL (ref 9.4–12.4)
POCT TEST: ABNORMAL
POTASSIUM SERPL-SCNC: 3.9 MEQ/L (ref 3.5–5.1)
RBC # BLD AUTO: 4.33 M/UL (ref 4.5–5.8)
SODIUM SERPL-SCNC: 141 MEQ/L (ref 136–145)
WBC # BLD AUTO: 7.37 K/UL (ref 3.8–10.5)

## 2025-05-18 PROCEDURE — 63600000 HC DRUGS/DETAIL CODE: Performed by: INTERNAL MEDICINE

## 2025-05-18 PROCEDURE — 36415 COLL VENOUS BLD VENIPUNCTURE: CPT

## 2025-05-18 PROCEDURE — 63700000 HC SELF-ADMINISTRABLE DRUG: Performed by: HOSPITALIST

## 2025-05-18 PROCEDURE — 85027 COMPLETE CBC AUTOMATED: CPT

## 2025-05-18 PROCEDURE — 84100 ASSAY OF PHOSPHORUS: CPT

## 2025-05-18 PROCEDURE — 25000000 HC PHARMACY GENERAL

## 2025-05-18 PROCEDURE — 71045 X-RAY EXAM CHEST 1 VIEW: CPT

## 2025-05-18 PROCEDURE — 20000000 HC ROOM AND CARE ICU

## 2025-05-18 PROCEDURE — 83735 ASSAY OF MAGNESIUM: CPT

## 2025-05-18 PROCEDURE — 25800000 HC PHARMACY IV SOLUTIONS: Performed by: INTERNAL MEDICINE

## 2025-05-18 PROCEDURE — 63700000 HC SELF-ADMINISTRABLE DRUG

## 2025-05-18 PROCEDURE — 63600000 HC DRUGS/DETAIL CODE: Mod: JZ

## 2025-05-18 PROCEDURE — 80048 BASIC METABOLIC PNL TOTAL CA: CPT

## 2025-05-18 PROCEDURE — 25800000 HC PHARMACY IV SOLUTIONS

## 2025-05-18 PROCEDURE — 25800000 HC PHARMACY IV SOLUTIONS: Performed by: HOSPITALIST

## 2025-05-18 PROCEDURE — 200200 PR NO CHARGE: Performed by: THORACIC SURGERY (CARDIOTHORACIC VASCULAR SURGERY)

## 2025-05-18 RX ORDER — INSULIN GLARGINE 100 [IU]/ML
8 INJECTION, SOLUTION SUBCUTANEOUS ONCE
Status: COMPLETED | OUTPATIENT
Start: 2025-05-18 | End: 2025-05-18

## 2025-05-18 RX ORDER — INSULIN LISPRO 100 [IU]/ML
2-6 INJECTION, SOLUTION INTRAVENOUS; SUBCUTANEOUS
Status: DISCONTINUED | OUTPATIENT
Start: 2025-05-18 | End: 2025-05-19 | Stop reason: HOSPADM

## 2025-05-18 RX ORDER — INSULIN LISPRO 100 [IU]/ML
3 INJECTION, SOLUTION INTRAVENOUS; SUBCUTANEOUS
Status: DISCONTINUED | OUTPATIENT
Start: 2025-05-19 | End: 2025-05-19 | Stop reason: HOSPADM

## 2025-05-18 RX ORDER — SODIUM CHLORIDE 9 MG/ML
INJECTION, SOLUTION INTRAVENOUS CONTINUOUS
Status: DISCONTINUED | OUTPATIENT
Start: 2025-05-18 | End: 2025-05-19 | Stop reason: HOSPADM

## 2025-05-18 RX ORDER — INSULIN LISPRO 100 [IU]/ML
4 INJECTION, SOLUTION INTRAVENOUS; SUBCUTANEOUS
Status: DISCONTINUED | OUTPATIENT
Start: 2025-05-19 | End: 2025-05-18

## 2025-05-18 RX ORDER — LANOLIN ALCOHOL/MO/W.PET/CERES
400 CREAM (GRAM) TOPICAL ONCE
Status: COMPLETED | OUTPATIENT
Start: 2025-05-18 | End: 2025-05-18

## 2025-05-18 RX ORDER — POTASSIUM CHLORIDE 20 MEQ/1
20 TABLET, EXTENDED RELEASE ORAL ONCE
Status: COMPLETED | OUTPATIENT
Start: 2025-05-18 | End: 2025-05-18

## 2025-05-18 RX ADMIN — METRONIDAZOLE 500 MG: 5 INJECTION, SOLUTION INTRAVENOUS at 04:12

## 2025-05-18 RX ADMIN — LAMOTRIGINE 125 MG: 100 TABLET ORAL at 20:20

## 2025-05-18 RX ADMIN — ENOXAPARIN SODIUM 40 MG: 40 INJECTION SUBCUTANEOUS at 17:07

## 2025-05-18 RX ADMIN — Medication 400 MG: at 06:07

## 2025-05-18 RX ADMIN — SODIUM CHLORIDE: 9 INJECTION, SOLUTION INTRAVENOUS at 18:00

## 2025-05-18 RX ADMIN — DEXTROSE AND SODIUM CHLORIDE: 5; 450 INJECTION, SOLUTION INTRAVENOUS at 17:07

## 2025-05-18 RX ADMIN — INSULIN LISPRO 2 UNITS: 100 INJECTION, SOLUTION INTRAVENOUS; SUBCUTANEOUS at 21:49

## 2025-05-18 RX ADMIN — FLUCONAZOLE 200 MG: 2 INJECTION, SOLUTION INTRAVENOUS at 11:21

## 2025-05-18 RX ADMIN — DEXTROSE AND SODIUM CHLORIDE: 5; 450 INJECTION, SOLUTION INTRAVENOUS at 06:52

## 2025-05-18 RX ADMIN — INSULIN GLARGINE 8 UNITS: 100 INJECTION, SOLUTION SUBCUTANEOUS at 20:37

## 2025-05-18 RX ADMIN — POTASSIUM CHLORIDE 20 MEQ: 1500 TABLET, EXTENDED RELEASE ORAL at 06:07

## 2025-05-18 RX ADMIN — INSULIN HUMAN 0.2 UNITS/HR: 1 INJECTION, SOLUTION INTRAVENOUS at 20:18

## 2025-05-18 RX ADMIN — LAMOTRIGINE 125 MG: 100 TABLET ORAL at 09:06

## 2025-05-18 RX ADMIN — CEFTRIAXONE SODIUM 1 G: 1 INJECTION, POWDER, FOR SOLUTION INTRAMUSCULAR; INTRAVENOUS at 14:03

## 2025-05-18 RX ADMIN — METRONIDAZOLE 500 MG: 5 INJECTION, SOLUTION INTRAVENOUS at 12:26

## 2025-05-18 RX ADMIN — METRONIDAZOLE 500 MG: 5 INJECTION, SOLUTION INTRAVENOUS at 20:21

## 2025-05-18 ASSESSMENT — COGNITIVE AND FUNCTIONAL STATUS - GENERAL
CLIMB 3 TO 5 STEPS WITH RAILING: 4 - NONE
MOVING TO AND FROM BED TO CHAIR: 4 - NONE
STANDING UP FROM CHAIR USING ARMS: 4 - NONE
WALKING IN HOSPITAL ROOM: 4 - NONE

## 2025-05-18 NOTE — PROGRESS NOTES
Thoracic Surgery Daily Progress Note    Subjective     Interval History:   NAEON. Tolerating soft diet - ate omellette, chicken, salad. Denies chest pain, SOB, abdominal pain. No n/v. AFVSS.    Objective     Vital signs in last 24 hours:  Temp:  [36.7 °C (98 °F)-37.3 °C (99.2 °F)] 37.1 °C (98.7 °F)  Heart Rate:  [52-99] 76  Resp:  [12-20] 12  BP: (108-150)/(60-84) 122/76      Intake/Output Summary (Last 24 hours) at 5/18/2025 1938  Last data filed at 5/18/2025 1800  Gross per 24 hour   Intake 3734.87 ml   Output 901 ml   Net 2833.87 ml     Intake/Output this shift:  No intake/output data recorded.    Physical Exam  General: well appearing, no acute distress   HEENT: EOMI, normocephalic, atraumatic, neck supple  Respiratory: normal effort, breathing comfortably on room air   Cardiovascular: regular rate  Chest: Non-tender   Abdomen: soft, NTND  Extremities: warm, well perfused  Neuro: grossly intact, AAOx3  Psych: normal affect        Labs  I have reviewed the patient's current labs.    Imaging  I have reviewed the imaging from the past 24 hours.      Assessment/Plan     19M with DKA and emesis, now with chest pain and pneumomediastinum on CT. C/f esophageal perforation     - continue soft diet; will discuss advancing  - if febrile or decompensates please obtain CT esophagram  - Cont CTX/flagyl/fluconazole for esophageal perf coverage    Expected Discharge Date:   5/21/2025        Isabel Nunez  Department of Surgery  PGY-3

## 2025-05-18 NOTE — PROGRESS NOTES
Critical Care Progress Note      INTERVAL SUMMARY    No acute events overnight, afebrile, on room air  On insulin gtt, NPO  No episodes of emesis overnight  Patient continues to feel much better    OBJECTIVE   VITAL SIGNS  Temp:  [36.7 °C (98 °F)-37.3 °C (99.2 °F)] 36.7 °C (98 °F)  Heart Rate:  [] 52  Resp:  [14-31] 16  BP: (108-148)/(60-93) 113/60       Intake/Output Summary (Last 24 hours) at 5/18/2025 0849  Last data filed at 5/18/2025 0700  Gross per 24 hour   Intake 4089.8 ml   Output --   Net 4089.8 ml       O2 Requirement:  Oxygen Therapy: None (Room air)                MEDICATIONS  cefTRIAXone, 1 g, q24h INT  enoxaparin, 40 mg, Daily (6p)  fluconazole, 200 mg, q24h INT  lamoTRIgine, 125 mg, q12h BARI  metroNIDAZOLE, 500 mg, q8h INT           LAB RESULTS  Recent Results (from the past 24 hours)   POCT Glucose    Collection Time: 05/17/25  9:00 AM   Result Value Ref Range    POCT Bedside Glucose 193 (H) 70 - 99 mg/dL    POC Test POC    POCT Glucose    Collection Time: 05/17/25 10:09 AM   Result Value Ref Range    POCT Bedside Glucose 167 (H) 70 - 99 mg/dL    POC Test POC    POCT Glucose    Collection Time: 05/17/25 10:59 AM   Result Value Ref Range    POCT Bedside Glucose 186 (H) 70 - 99 mg/dL    POC Test POC    POCT Glucose    Collection Time: 05/17/25 12:12 PM   Result Value Ref Range    POCT Bedside Glucose 198 (H) 70 - 99 mg/dL    POC Test POC    Basic metabolic panel    Collection Time: 05/17/25 12:20 PM   Result Value Ref Range    Sodium 136 136 - 145 mEQ/L    Potassium 3.6 3.5 - 5.1 mEQ/L    Chloride 104 98 - 107 mEQ/L    CO2 21 21 - 31 mEQ/L    BUN 4 (L) 7 - 25 mg/dL    Creatinine 0.7 0.7 - 1.3 mg/dL    Glucose 224 (H) 70 - 99 mg/dL    Calcium 9.1 8.6 - 10.3 mg/dL    eGFR >60.0 >=60.0 mL/min/1.73m*2    Anion Gap 11 3 - 15 mEQ/L   Magnesium    Collection Time: 05/17/25 12:20 PM   Result Value Ref Range    Magnesium 1.9 1.8 - 2.5 mg/dL   Phosphorus    Collection Time: 05/17/25 12:20 PM   Result  Value Ref Range    Phosphorus 2.3 (L) 2.4 - 4.7 mg/dL   POCT Glucose    Collection Time: 05/17/25 12:55 PM   Result Value Ref Range    POCT Bedside Glucose 194 (H) 70 - 99 mg/dL    POC Test POC    POCT Glucose    Collection Time: 05/17/25  1:58 PM   Result Value Ref Range    POCT Bedside Glucose 209 (H) 70 - 99 mg/dL    POC Test POC    POCT Glucose    Collection Time: 05/17/25  3:08 PM   Result Value Ref Range    POCT Bedside Glucose 210 (H) 70 - 99 mg/dL    POC Test POC    POCT Glucose    Collection Time: 05/17/25  4:09 PM   Result Value Ref Range    POCT Bedside Glucose 193 (H) 70 - 99 mg/dL    POC Test POC    POCT Glucose    Collection Time: 05/17/25  5:25 PM   Result Value Ref Range    POCT Bedside Glucose 140 (H) 70 - 99 mg/dL    POC Test POC    POCT Glucose    Collection Time: 05/17/25  6:10 PM   Result Value Ref Range    POCT Bedside Glucose 115 (H) 70 - 99 mg/dL    POC Test POC    POCT Glucose    Collection Time: 05/17/25  6:59 PM   Result Value Ref Range    POCT Bedside Glucose 89 70 - 99 mg/dL    POC Test POC    POCT Glucose    Collection Time: 05/17/25  8:00 PM   Result Value Ref Range    POCT Bedside Glucose 71 70 - 99 mg/dL    POC Test POC    POCT Glucose    Collection Time: 05/17/25  8:57 PM   Result Value Ref Range    POCT Bedside Glucose 116 (H) 70 - 99 mg/dL    POC Test POC    POCT Glucose    Collection Time: 05/17/25  9:58 PM   Result Value Ref Range    POCT Bedside Glucose 129 (H) 70 - 99 mg/dL    POC Test POC    POCT Glucose    Collection Time: 05/17/25 11:09 PM   Result Value Ref Range    POCT Bedside Glucose 179 (H) 70 - 99 mg/dL    POC Test POC    POCT Glucose    Collection Time: 05/17/25 11:58 PM   Result Value Ref Range    POCT Bedside Glucose 194 (H) 70 - 99 mg/dL    POC Test POC    POCT Glucose    Collection Time: 05/18/25  1:03 AM   Result Value Ref Range    POCT Bedside Glucose 192 (H) 70 - 99 mg/dL    POC Test POC    POCT Glucose    Collection Time: 05/18/25  3:03 AM   Result Value Ref  Range    POCT Bedside Glucose 169 (H) 70 - 99 mg/dL    POC Test POC    POCT Glucose    Collection Time: 05/18/25  4:09 AM   Result Value Ref Range    POCT Bedside Glucose 147 (H) 70 - 99 mg/dL    POC Test POC    POCT Glucose    Collection Time: 05/18/25  4:58 AM   Result Value Ref Range    POCT Bedside Glucose 106 (H) 70 - 99 mg/dL    POC Test POC    CBC    Collection Time: 05/18/25  4:59 AM   Result Value Ref Range    WBC 7.37 3.80 - 10.50 K/uL    RBC 4.33 (L) 4.50 - 5.80 M/uL    Hemoglobin 13.0 (L) 13.7 - 17.5 g/dL    Hematocrit 37.0 (L) 40.1 - 51.0 %    MCV 85.5 83.0 - 98.0 fL    MCH 30.0 28.0 - 33.2 pg    MCHC 35.1 32.2 - 36.5 g/dL    RDW 11.9 11.6 - 14.4 %    Platelets 243 150 - 350 K/uL    MPV 11.0 9.4 - 12.4 fL   Basic metabolic panel    Collection Time: 05/18/25  4:59 AM   Result Value Ref Range    Sodium 141 136 - 145 mEQ/L    Potassium 3.9 3.5 - 5.1 mEQ/L    Chloride 108 (H) 98 - 107 mEQ/L    CO2 25 21 - 31 mEQ/L    BUN 4 (L) 7 - 25 mg/dL    Creatinine 0.7 0.7 - 1.3 mg/dL    Glucose 103 (H) 70 - 99 mg/dL    Calcium 9.1 8.6 - 10.3 mg/dL    eGFR >60.0 >=60.0 mL/min/1.73m*2    Anion Gap 8 3 - 15 mEQ/L   Magnesium    Collection Time: 05/18/25  4:59 AM   Result Value Ref Range    Magnesium 1.8 1.8 - 2.5 mg/dL   Phosphorus    Collection Time: 05/18/25  4:59 AM   Result Value Ref Range    Phosphorus 3.8 2.4 - 4.7 mg/dL   POCT Glucose    Collection Time: 05/18/25  6:02 AM   Result Value Ref Range    POCT Bedside Glucose 109 (H) 70 - 99 mg/dL    POC Test POC    POCT Glucose    Collection Time: 05/18/25  6:53 AM   Result Value Ref Range    POCT Bedside Glucose 118 (H) 70 - 99 mg/dL    POC Test POC    POCT Glucose    Collection Time: 05/18/25  8:05 AM   Result Value Ref Range    POCT Bedside Glucose 125 (H) 70 - 99 mg/dL    POC Test POC      Laboratory results were independently reviewed    ABG  Results from last 7 days   Lab Units 05/16/25  0637   SOURCE OF OXYGEN  RA         CULTURES  Microbiology Results       **  No results found for the last 720 hours. **            Laboratory results were independently reviewed    RADIOLOGY  No results found.    Radiography was independently reviewed.      Telemetry: sinus tachycardia    VTE Risk Assessment and Plan  Lovenox    GI prophylaxis  Not indicated      PHYSICAL EXAM    GEN: WDWN, critically ill  HENT: NC/AT, no deformity  NECK: supple, no lymphadenopathy  CARD: RRR, no rubs, murmur, gallops  RESP: CTA, no wheeze, rales, crackles  Gi: soft, NTND, +BS  EXT: no edema, pulses present  SKIN: warm, dry, no rash  NEURO: AAOx3, nonfocal      ASSESSMENT & PLAN    Assessment & Plan  Diabetic ketoacidosis without coma associated with type 1 diabetes mellitus (CMS/HCC)  Pt with hyperglycemia, anion gap metabolic acidosis, elevated beta hydroxybutyrate, likely DKA     - insulin gtt, Q1 hr accu checks while on gtt  - monitor BMPs  - aggressive IVF resuscitation  - endocrine consult  - OK to advance to mechanical/soft diet 5/18. Discussed with both Dr. Brown and Dr. Peoples who both agree OK to advance diet.   - appropriate DVT ppx   Hyponatremia  Likely 2nd to hypovolemia, hyperglycemia  Continue IVFs, previously on dextrose-containing fluids   Metabolic acidosis  Likely 2nd to dka, also elevated lactate  Improving     - Continue IVF resuscitation  - Follow chemistries per DKA protocol  - Follow venous blood gas   Leukocytosis  Suspect reactive 2nd to DKA but will check CT A/P to rule out acute intra abdominal condition/process  Pneumomediastinum (CMS/HCC)  Aspiration pneumonia (CMS/HCC)  CT abd/pelvis shows pneumomediastinum as well as left lower lobe lung consolidation  Concern for esophageal tear in setting of multiple days of wretching/emesis   ?superimposed Aspiration PNA     - SpO2 goal > 92%  - CXR daily  - thoracic surgery consult  - fluoroscopy barium swallow, no obvious evidence of esophageal leak but redemonstrates of pneumomediastinum and subcutaneous air in neck and soft tissues    - OK to advance to mechanical/soft diet   - low threshold to get stat CT esophagram with new fever, significant increase in WBC, or other signs of developing infection  - empiric broad spectrum abx coverage  - ID now following      Reviewed with Dr. Brown  Patient's mother at bedside.  Provided her an update.  She advised me that she is well aware of the entire situation and has spoken with consultants.  She does not need any further information.  Her instructions are to begin oral feedings reduce antibiotics and  to be discharged as soon as possible.    Acting as a scribe in the presence of GERARD Lopez    Seen and evaluated.  Performed  the critical portions of history and physical exam.  Available medical records reviewed  All laboratory, diagnostic studies and imaging reviewed   Communicated directly with consultants and coordinated care  Provided counseling and education where appropriate  Performed decision making and formation of treatment plan.    I have reviewed and agree with the note.as written  Olvin Boucher MD  CCT-  35  Min

## 2025-05-18 NOTE — ASSESSMENT & PLAN NOTE
CT abd/pelvis shows pneumomediastinum as well as left lower lobe lung consolidation  Concern for esophageal tear in setting of multiple days of wretching/emesis   ?superimposed Aspiration PNA     - SpO2 goal > 92%  - CXR daily  - thoracic surgery consult  - fluoroscopy barium swallow, no obvious evidence of esophageal leak but redemonstrates of pneumomediastinum and subcutaneous air in neck and soft tissues   - started on clear liquid diet 5/17  - low threshold to get stat CT esophagram with new fever, significant increase in WBC, or other signs of developing infection  - empiric broad spectrum abx coverage  - ID now following

## 2025-05-18 NOTE — PROGRESS NOTES
Critical Care Progress Note      INTERVAL SUMMARY    No acute events overnight, afebrile, on room air  On insulin gtt, NPO  No episodes of emesis overnight  Patient feeling much better today. Abdominal/chest pain resolved    OBJECTIVE   VITAL SIGNS  Temp:  [36.5 °C (97.7 °F)-37.3 °C (99.2 °F)] 37.3 °C (99.2 °F)  Heart Rate:  [] 81  Resp:  [14-31] 14  BP: (114-133)/(61-93) 119/65       Intake/Output Summary (Last 24 hours) at 5/17/2025 2158  Last data filed at 5/17/2025 2053  Gross per 24 hour   Intake 4509.08 ml   Output 400 ml   Net 4109.08 ml       O2 Requirement:  Oxygen Therapy: None (Room air)                MEDICATIONS  cefTRIAXone, 1 g, q24h INT  enoxaparin, 40 mg, Daily (6p)  fluconazole, 200 mg, q24h INT  lamoTRIgine, 125 mg, q12h BARI  metroNIDAZOLE, 500 mg, q8h INT           LAB RESULTS  Recent Results (from the past 24 hours)   POCT Glucose    Collection Time: 05/16/25  9:59 PM   Result Value Ref Range    POCT Bedside Glucose 201 (H) 70 - 99 mg/dL    POC Test POC    POCT Glucose    Collection Time: 05/16/25 11:01 PM   Result Value Ref Range    POCT Bedside Glucose 201 (H) 70 - 99 mg/dL    POC Test POC    POCT Glucose    Collection Time: 05/16/25 11:57 PM   Result Value Ref Range    POCT Bedside Glucose 197 (H) 70 - 99 mg/dL    POC Test POC    POCT Glucose    Collection Time: 05/17/25  1:03 AM   Result Value Ref Range    POCT Bedside Glucose 228 (H) 70 - 99 mg/dL    POC Test POC    POCT Glucose    Collection Time: 05/17/25  2:00 AM   Result Value Ref Range    POCT Bedside Glucose 154 (H) 70 - 99 mg/dL    POC Test POC    POCT Glucose    Collection Time: 05/17/25  2:36 AM   Result Value Ref Range    POCT Bedside Glucose 113 (H) 70 - 99 mg/dL    POC Test POC    POCT Glucose    Collection Time: 05/17/25  3:04 AM   Result Value Ref Range    POCT Bedside Glucose 117 (H) 70 - 99 mg/dL    POC Test POC    POCT Glucose    Collection Time: 05/17/25  4:06 AM   Result Value Ref Range    POCT Bedside Glucose 161  (H) 70 - 99 mg/dL    POC Test POC    CBC    Collection Time: 05/17/25  4:10 AM   Result Value Ref Range    WBC 9.24 3.80 - 10.50 K/uL    RBC 4.71 4.50 - 5.80 M/uL    Hemoglobin 14.3 13.7 - 17.5 g/dL    Hematocrit 40.7 40.1 - 51.0 %    MCV 86.4 83.0 - 98.0 fL    MCH 30.4 28.0 - 33.2 pg    MCHC 35.1 32.2 - 36.5 g/dL    RDW 11.9 11.6 - 14.4 %    Platelets 252 150 - 350 K/uL    MPV 10.9 9.4 - 12.4 fL   Basic metabolic panel    Collection Time: 05/17/25  4:10 AM   Result Value Ref Range    Sodium 138 136 - 145 mEQ/L    Potassium 3.7 3.5 - 5.1 mEQ/L    Chloride 104 98 - 107 mEQ/L    CO2 22 21 - 31 mEQ/L    BUN 5 (L) 7 - 25 mg/dL    Creatinine 0.8 0.7 - 1.3 mg/dL    Glucose 162 (H) 70 - 99 mg/dL    Calcium 9.5 8.6 - 10.3 mg/dL    eGFR >60.0 >=60.0 mL/min/1.73m*2    Anion Gap 12 3 - 15 mEQ/L   Magnesium    Collection Time: 05/17/25  4:10 AM   Result Value Ref Range    Magnesium 1.7 (L) 1.8 - 2.5 mg/dL   Phosphorus    Collection Time: 05/17/25  4:10 AM   Result Value Ref Range    Phosphorus 3.4 2.4 - 4.7 mg/dL   POCT Glucose    Collection Time: 05/17/25  5:02 AM   Result Value Ref Range    POCT Bedside Glucose 170 (H) 70 - 99 mg/dL    POC Test POC    POCT Glucose    Collection Time: 05/17/25  6:00 AM   Result Value Ref Range    POCT Bedside Glucose 197 (H) 70 - 99 mg/dL    POC Test POC    POCT Glucose    Collection Time: 05/17/25  6:52 AM   Result Value Ref Range    POCT Bedside Glucose 179 (H) 70 - 99 mg/dL    POC Test POC    POCT Glucose    Collection Time: 05/17/25  8:04 AM   Result Value Ref Range    POCT Bedside Glucose 172 (H) 70 - 99 mg/dL    POC Test POC    POCT Glucose    Collection Time: 05/17/25  9:00 AM   Result Value Ref Range    POCT Bedside Glucose 193 (H) 70 - 99 mg/dL    POC Test POC    POCT Glucose    Collection Time: 05/17/25 10:09 AM   Result Value Ref Range    POCT Bedside Glucose 167 (H) 70 - 99 mg/dL    POC Test POC    POCT Glucose    Collection Time: 05/17/25 10:59 AM   Result Value Ref Range    POCT  Bedside Glucose 186 (H) 70 - 99 mg/dL    POC Test POC    POCT Glucose    Collection Time: 05/17/25 12:12 PM   Result Value Ref Range    POCT Bedside Glucose 198 (H) 70 - 99 mg/dL    POC Test POC    Basic metabolic panel    Collection Time: 05/17/25 12:20 PM   Result Value Ref Range    Sodium 136 136 - 145 mEQ/L    Potassium 3.6 3.5 - 5.1 mEQ/L    Chloride 104 98 - 107 mEQ/L    CO2 21 21 - 31 mEQ/L    BUN 4 (L) 7 - 25 mg/dL    Creatinine 0.7 0.7 - 1.3 mg/dL    Glucose 224 (H) 70 - 99 mg/dL    Calcium 9.1 8.6 - 10.3 mg/dL    eGFR >60.0 >=60.0 mL/min/1.73m*2    Anion Gap 11 3 - 15 mEQ/L   Magnesium    Collection Time: 05/17/25 12:20 PM   Result Value Ref Range    Magnesium 1.9 1.8 - 2.5 mg/dL   Phosphorus    Collection Time: 05/17/25 12:20 PM   Result Value Ref Range    Phosphorus 2.3 (L) 2.4 - 4.7 mg/dL   POCT Glucose    Collection Time: 05/17/25 12:55 PM   Result Value Ref Range    POCT Bedside Glucose 194 (H) 70 - 99 mg/dL    POC Test POC    POCT Glucose    Collection Time: 05/17/25  1:58 PM   Result Value Ref Range    POCT Bedside Glucose 209 (H) 70 - 99 mg/dL    POC Test POC    POCT Glucose    Collection Time: 05/17/25  3:08 PM   Result Value Ref Range    POCT Bedside Glucose 210 (H) 70 - 99 mg/dL    POC Test POC    POCT Glucose    Collection Time: 05/17/25  4:09 PM   Result Value Ref Range    POCT Bedside Glucose 193 (H) 70 - 99 mg/dL    POC Test POC    POCT Glucose    Collection Time: 05/17/25  5:25 PM   Result Value Ref Range    POCT Bedside Glucose 140 (H) 70 - 99 mg/dL    POC Test POC    POCT Glucose    Collection Time: 05/17/25  6:10 PM   Result Value Ref Range    POCT Bedside Glucose 115 (H) 70 - 99 mg/dL    POC Test POC    POCT Glucose    Collection Time: 05/17/25  6:59 PM   Result Value Ref Range    POCT Bedside Glucose 89 70 - 99 mg/dL    POC Test POC    POCT Glucose    Collection Time: 05/17/25  8:00 PM   Result Value Ref Range    POCT Bedside Glucose 71 70 - 99 mg/dL    POC Test POC    POCT Glucose     Collection Time: 05/17/25  8:57 PM   Result Value Ref Range    POCT Bedside Glucose 116 (H) 70 - 99 mg/dL    POC Test POC      Laboratory results were independently reviewed    ABG  Results from last 7 days   Lab Units 05/16/25  0452   SOURCE OF OXYGEN  RA         CULTURES  Microbiology Results       ** No results found for the last 720 hours. **            Laboratory results were independently reviewed    RADIOLOGY  X-RAY CHEST 1 VIEW  Result Date: 5/17/2025  CLINICAL HISTORY:   Hypoxia. COMMENT:    A single frontal view of the chest was obtained using portable technique.  Comparison with a similar study from 5/16/2025. The heart and pulmonary vascularity appear within normal limits.  The lungs are free of fluid and infiltrate.  No osseous or mediastinal abnormality is identified.     IMPRESSION: No active disease is seen in the chest.    Radiography was independently reviewed.      Telemetry: sinus tachycardia    VTE Risk Assessment and Plan  Lovenox    GI prophylaxis  Not indicated      PHYSICAL EXAM    GEN: WDWN, critically ill  HENT: NC/AT, no deformity  NECK: supple, no lymphadenopathy  CARD: RRR, no rubs, murmur, gallops  RESP: CTA, no wheeze, rales, crackles  Gi: soft, NTND, +BS  EXT: no edema, pulses present  SKIN: warm, dry, no rash  NEURO: AAOx3, nonfocal      ASSESSMENT & PLAN    Assessment & Plan  Diabetic ketoacidosis without coma associated with type 1 diabetes mellitus (CMS/HCC)  Pt with hyperglycemia, anion gap metabolic acidosis, elevated beta hydroxybutyrate, likely DKA     - insulin gtt, Q1 hr accu checks while on gtt  - monitor BMPs  - aggressive IVF resuscitation  - endocrine consult  - started on clear liquids 5/17  - appropriate DVT ppx   Hyponatremia  Likely 2nd to hypovolemia, hyperglycemia  Continue IVFs, now on dextrose-containing fluids   Metabolic acidosis  Likely 2nd to dka, also elevated lactate  Improving     - Continue IVF resuscitation  - Follow chemistries per DKA protocol  -  Follow venous blood gas   Leukocytosis  Suspect reactive 2nd to DKA but will check CT A/P to rule out acute intra abdominal condition/process  Pneumomediastinum (CMS/HCC)  Aspiration pneumonia (CMS/HCC)  CT abd/pelvis shows pneumomediastinum as well as left lower lobe lung consolidation  Concern for esophageal tear in setting of multiple days of wretching/emesis   ?superimposed Aspiration PNA     - SpO2 goal > 92%  - CXR daily  - thoracic surgery consult  - fluoroscopy barium swallow, no obvious evidence of esophageal leak but redemonstrates of pneumomediastinum and subcutaneous air in neck and soft tissues   - started on clear liquid diet 5/17  - low threshold to get stat CT esophagram with new fever, significant increase in WBC, or other signs of developing infection  - empiric broad spectrum abx coverage  - ID now following        Acting as a scribe in the presence of GERARD Lopez    Seen and evaluated.  Performed  the critical portions of history and physical exam.  Available medical records reviewed  All laboratory, diagnostic studies and imaging reviewed   Communicated directly with consultants and coordinated care  Provided counseling and education where appropriate  Performed decision making and formation of treatment plan.    I have reviewed and agree with the note.as written  Olvin Boucher MD  CCT-  40  Min

## 2025-05-18 NOTE — ASSESSMENT & PLAN NOTE
Patient on insulin gtt at 1.9 units /hr. Bg under good control. AG closed. Changed to software calculated protocol . Trying to eat. Tolerated more food today. Advance as tolerated and as allowed. Will follow. Will try to change to sq tonight if tolerates another meal. Has cgm with him to place on when changes to sq insulin.

## 2025-05-18 NOTE — ASSESSMENT & PLAN NOTE
CT abd/pelvis shows pneumomediastinum as well as left lower lobe lung consolidation  Concern for esophageal tear in setting of multiple days of wretching/emesis   ?superimposed Aspiration PNA     - SpO2 goal > 92%  - CXR daily  - thoracic surgery consult  - fluoroscopy barium swallow, no obvious evidence of esophageal leak but redemonstrates of pneumomediastinum and subcutaneous air in neck and soft tissues   - OK to advance to mechanical/soft diet   - low threshold to get stat CT esophagram with new fever, significant increase in WBC, or other signs of developing infection  - empiric broad spectrum abx coverage  - ID now following

## 2025-05-18 NOTE — ASSESSMENT & PLAN NOTE
Pt with hyperglycemia, anion gap metabolic acidosis, elevated beta hydroxybutyrate, likely DKA     - insulin gtt, Q1 hr accu checks while on gtt  - monitor BMPs  - aggressive IVF resuscitation  - endocrine consult  - started on clear liquids 5/17  - appropriate DVT ppx

## 2025-05-18 NOTE — ASSESSMENT & PLAN NOTE
Likely 2nd to hypovolemia, hyperglycemia  Continue IVFs, previously on dextrose-containing fluids

## 2025-05-18 NOTE — PROGRESS NOTES
Endocrine Daily Progress Note    Pt doing better today ate more food.   No complaints. Has cgms  Medications Ordered Prior to Encounter[1]  Pertinent radiology and labs reviewed .  Physical Exam  Constitutional:       Appearance: Normal appearance.   HENT:      Head: Atraumatic.   Cardiovascular:      Rate and Rhythm: Normal rate.   Pulmonary:      Effort: Pulmonary effort is normal.   Abdominal:      Palpations: Abdomen is soft.   Musculoskeletal:      Cervical back: Neck supple.      Right lower leg: No edema.      Left lower leg: No edema.   Neurological:      Mental Status: He is oriented to person, place, and time.         Assessment & Plan  Assessment & Plan  Diabetic ketoacidosis without coma associated with type 1 diabetes mellitus (CMS/[facility])  Patient on insulin gtt at 1.9 units /hr. Bg under good control. AG closed. Changed to software calculated protocol . Trying to eat. Tolerated more food today. Advance as tolerated and as allowed. Will follow. Will try to change to sq tonight if tolerates another meal. Has cgm with him to place on when changes to sq insulin.         [1]   No current facility-administered medications on file prior to encounter.     Current Outpatient Medications on File Prior to Encounter   Medication Sig Dispense Refill    insulin lispro U-100 (HumaLOG) 100 unit/mL pen Inject under the skin 3 (three) times a day before meals.      lamoTRIgine (LaMICtal) 100 mg tablet Take 125 mg by mouth daily.

## 2025-05-18 NOTE — ASSESSMENT & PLAN NOTE
Pt with hyperglycemia, anion gap metabolic acidosis, elevated beta hydroxybutyrate, likely DKA     - insulin gtt, Q1 hr accu checks while on gtt  - monitor BMPs  - aggressive IVF resuscitation  - endocrine consult  - OK to advance to mechanical/soft diet 5/18. Discussed with both Dr. Brown and Dr. Peoples who both agree OK to advance diet.   - appropriate DVT ppx

## 2025-05-19 ENCOUNTER — APPOINTMENT (INPATIENT)
Dept: RADIOLOGY | Facility: HOSPITAL | Age: 20
DRG: 637 | End: 2025-05-19
Payer: COMMERCIAL

## 2025-05-19 VITALS
HEIGHT: 71 IN | DIASTOLIC BLOOD PRESSURE: 79 MMHG | RESPIRATION RATE: 16 BRPM | OXYGEN SATURATION: 96 % | HEART RATE: 67 BPM | SYSTOLIC BLOOD PRESSURE: 122 MMHG | WEIGHT: 142 LBS | TEMPERATURE: 97.8 F | BODY MASS INDEX: 19.88 KG/M2

## 2025-05-19 LAB
ANION GAP SERPL CALC-SCNC: 10 MEQ/L (ref 3–15)
BUN SERPL-MCNC: 7 MG/DL (ref 7–25)
CALCIUM SERPL-MCNC: 9.1 MG/DL (ref 8.6–10.3)
CHLORIDE SERPL-SCNC: 105 MEQ/L (ref 98–107)
CO2 SERPL-SCNC: 25 MEQ/L (ref 21–31)
CREAT SERPL-MCNC: 0.7 MG/DL (ref 0.7–1.3)
EGFRCR SERPLBLD CKD-EPI 2021: >60 ML/MIN/1.73M*2
ERYTHROCYTE [DISTWIDTH] IN BLOOD BY AUTOMATED COUNT: 11.9 % (ref 11.6–14.4)
GLUCOSE SERPL-MCNC: 170 MG/DL (ref 70–99)
HCT VFR BLD AUTO: 38.7 % (ref 40.1–51)
HGB BLD-MCNC: 13.5 G/DL (ref 13.7–17.5)
MAGNESIUM SERPL-MCNC: 1.6 MG/DL (ref 1.8–2.5)
MCH RBC QN AUTO: 30.1 PG (ref 28–33.2)
MCHC RBC AUTO-ENTMCNC: 34.9 G/DL (ref 32.2–36.5)
MCV RBC AUTO: 86.2 FL (ref 83–98)
PHOSPHATE SERPL-MCNC: 4 MG/DL (ref 2.4–4.7)
PLATELET # BLD AUTO: 242 K/UL (ref 150–350)
PMV BLD AUTO: 11 FL (ref 9.4–12.4)
POTASSIUM SERPL-SCNC: 3.7 MEQ/L (ref 3.5–5.1)
RBC # BLD AUTO: 4.49 M/UL (ref 4.5–5.8)
SODIUM SERPL-SCNC: 140 MEQ/L (ref 136–145)
WBC # BLD AUTO: 4.61 K/UL (ref 3.8–10.5)

## 2025-05-19 PROCEDURE — 63700000 HC SELF-ADMINISTRABLE DRUG: Performed by: HOSPITALIST

## 2025-05-19 PROCEDURE — 85027 COMPLETE CBC AUTOMATED: CPT

## 2025-05-19 PROCEDURE — 63600000 HC DRUGS/DETAIL CODE: Mod: JZ

## 2025-05-19 PROCEDURE — 84100 ASSAY OF PHOSPHORUS: CPT

## 2025-05-19 PROCEDURE — 71045 X-RAY EXAM CHEST 1 VIEW: CPT

## 2025-05-19 PROCEDURE — 36415 COLL VENOUS BLD VENIPUNCTURE: CPT

## 2025-05-19 PROCEDURE — 63700000 HC SELF-ADMINISTRABLE DRUG

## 2025-05-19 PROCEDURE — 80048 BASIC METABOLIC PNL TOTAL CA: CPT

## 2025-05-19 PROCEDURE — 25800000 HC PHARMACY IV SOLUTIONS

## 2025-05-19 PROCEDURE — 83735 ASSAY OF MAGNESIUM: CPT

## 2025-05-19 RX ORDER — BLOOD-GLUCOSE SENSOR
EACH MISCELLANEOUS
Qty: 3 EACH | Refills: 0 | Status: SHIPPED | OUTPATIENT
Start: 2025-05-19

## 2025-05-19 RX ORDER — INSULIN LISPRO 100 [IU]/ML
8-12 INJECTION, SOLUTION SUBCUTANEOUS
COMMUNITY
End: 2025-05-19 | Stop reason: HOSPADM

## 2025-05-19 RX ORDER — INSULIN LISPRO 100 [IU]/ML
3 INJECTION, SOLUTION INTRAVENOUS; SUBCUTANEOUS
Qty: 15 ML | Refills: 5 | Status: SHIPPED | OUTPATIENT
Start: 2025-05-19 | End: 2025-06-18

## 2025-05-19 RX ORDER — LANOLIN ALCOHOL/MO/W.PET/CERES
400 CREAM (GRAM) TOPICAL ONCE
Status: COMPLETED | OUTPATIENT
Start: 2025-05-19 | End: 2025-05-19

## 2025-05-19 RX ORDER — LAMOTRIGINE 25 MG/1
25 TABLET ORAL 2 TIMES DAILY
COMMUNITY

## 2025-05-19 RX ORDER — INSULIN GLARGINE 100 [IU]/ML
8 INJECTION, SOLUTION SUBCUTANEOUS NIGHTLY
Qty: 2.4 ML | Refills: 0 | Status: SHIPPED | OUTPATIENT
Start: 2025-05-19 | End: 2025-06-18

## 2025-05-19 RX ORDER — INSULIN LISPRO 100 [IU]/ML
2-6 INJECTION, SOLUTION INTRAVENOUS; SUBCUTANEOUS
Qty: 15 ML | Refills: 5 | Status: SHIPPED | OUTPATIENT
Start: 2025-05-19 | End: 2025-06-18

## 2025-05-19 RX ORDER — AMOXICILLIN AND CLAVULANATE POTASSIUM 875; 125 MG/1; MG/1
1 TABLET, FILM COATED ORAL 2 TIMES DAILY
Qty: 12 TABLET | Refills: 0 | Status: SHIPPED | OUTPATIENT
Start: 2025-05-20 | End: 2025-05-26

## 2025-05-19 RX ORDER — INSULIN GLARGINE 100 [IU]/ML
5.5 INJECTION, SOLUTION SUBCUTANEOUS NIGHTLY
Status: ON HOLD | COMMUNITY
End: 2025-05-19

## 2025-05-19 RX ORDER — POTASSIUM CHLORIDE 20 MEQ/1
20 TABLET, EXTENDED RELEASE ORAL ONCE
Status: COMPLETED | OUTPATIENT
Start: 2025-05-19 | End: 2025-05-19

## 2025-05-19 RX ORDER — ESCITALOPRAM OXALATE 10 MG/1
10 TABLET ORAL 2 TIMES DAILY
COMMUNITY
End: 2025-05-19 | Stop reason: HOSPADM

## 2025-05-19 RX ADMIN — INSULIN LISPRO 3 UNITS: 100 INJECTION, SOLUTION INTRAVENOUS; SUBCUTANEOUS at 12:37

## 2025-05-19 RX ADMIN — CEFTRIAXONE SODIUM 1 G: 1 INJECTION, POWDER, FOR SOLUTION INTRAMUSCULAR; INTRAVENOUS at 10:56

## 2025-05-19 RX ADMIN — INSULIN LISPRO 3 UNITS: 100 INJECTION, SOLUTION INTRAVENOUS; SUBCUTANEOUS at 09:32

## 2025-05-19 RX ADMIN — LAMOTRIGINE 125 MG: 100 TABLET ORAL at 08:48

## 2025-05-19 RX ADMIN — POTASSIUM CHLORIDE 20 MEQ: 1500 TABLET, EXTENDED RELEASE ORAL at 06:36

## 2025-05-19 RX ADMIN — Medication 400 MG: at 06:36

## 2025-05-19 RX ADMIN — METRONIDAZOLE 500 MG: 5 INJECTION, SOLUTION INTRAVENOUS at 05:09

## 2025-05-19 RX ADMIN — INSULIN LISPRO 3 UNITS: 100 INJECTION, SOLUTION INTRAVENOUS; SUBCUTANEOUS at 12:36

## 2025-05-19 ASSESSMENT — COGNITIVE AND FUNCTIONAL STATUS - GENERAL
CLIMB 3 TO 5 STEPS WITH RAILING: 4 - NONE
STANDING UP FROM CHAIR USING ARMS: 4 - NONE
WALKING IN HOSPITAL ROOM: 4 - NONE
MOVING TO AND FROM BED TO CHAIR: 4 - NONE

## 2025-05-19 NOTE — DISCHARGE SUMMARY
Critical Care Discharge Summary    BRIEF OVERVIEW  Admitting Provider:  H&P Notes 4/19/2025 to 5/19/2025             Date of Service Author Author Type Status Note Type File Time    05/16/25 0642 Maira Asher MD Physician Addendum H&P 05/16/25 0645            Attending Provider: No att. providers found Attending phys phone: N/A  Primary Care Physician at Discharge: Pt States, No Pcp [phone]    Admission Date: 5/16/2025     Discharge Date: 5/19/2025    Primary Discharge Diagnosis  Diabetic ketoacidosis without coma associated with type 1 diabetes mellitus (CMS/[facility])    Secondary Discharge Diagnosis  Active Hospital Problems    Diagnosis Date Noted    Diabetic ketoacidosis without coma associated with type 1 diabetes mellitus (CMS/[facility]) 05/16/2025    Hyponatremia 05/16/2025    Metabolic acidosis 05/16/2025    Leukocytosis 05/16/2025    Aspiration pneumonia (CMS/[facility]) 05/16/2025    Pneumomediastinum (CMS/[facility]) 05/16/2025      Resolved Hospital Problems   No resolved problems to display.       DETAILS OF HOSPITAL STAY    Operative Procedures Performed      Consults:   Consult Notes 4/19/2025 to 5/19/2025             Date of Service Author Author Type Status Note Type File Time    05/17/25 1104 Era Zamarripa RD Registered Dietitian Addendum Consults 05/17/25 1112    05/16/25 1610 Salma Hoover MD Physician Signed Consults 05/16/25 1617    05/16/25 1529 Jack Meraz MD Physician Signed Consults 05/16/25 1534    05/16/25 1141 Ros Brown MD Resident Attested Consults 05/16/25 1614            Consult Orders During Admission:  IP CONSULT TO NUTRITION SERVICES  IP CONSULT TO ENDOCRINOLOGY  IP CONSULT TO THORACIC SURGERY  IP CONSULT TO INFECTIOUS DISEASE         Imaging  X-RAY CHEST 1 VIEW  Result Date: 5/19/2025  IMPRESSION: No active disease.    X-RAY CHEST 1 VIEW  Result Date: 5/18/2025  IMPRESSION: No evidence of acute disease. COMMENT:Frontal portable erect view of the chest was performed.  Comparison  Spontaneous, unlabored and symmetrical made with examinations dating back to 5/16/2025.  Overlying lines and leads.  Lungs are clear.  No evidence of a pleural effusion, pneumothorax or pneumomediastinum. Cardiac size is size is stable and normal.    X-RAY CHEST 1 VIEW  Result Date: 5/17/2025  IMPRESSION: No active disease is seen in the chest.    FLUOROSCOPY BARIUM SWALLOW  Result Date: 5/16/2025  IMPRESSION: The patient could not tolerate the Isovue contrast and kept vomiting during the course of the examination.  The visualized swallowing attempts did not demonstrate an obvious evidence of an esophageal leak. There is redemonstration of pneumomediastinum as well as subcutaneous air in the neck soft tissues.  There is persistent airspace opacity in the left lower lobe and retrocardiac region that could represent infiltrate including aspiration pneumonia. If clinically warranted, consider correlation with CT imaging of the chest/esophagram for further characterization.     X-RAY CHEST 1 VIEW  Result Date: 5/16/2025  IMPRESSION: There is airspace opacity in the retrocardiac and left lower lobe region that could represent atelectasis and/or infiltrate. Follow-up chest radiograph is recommended to document complete resolution.     CT ABDOMEN PELVIS WITHOUT IV CONTRAST  Result Date: 5/16/2025  IMPRESSION: 1.   No evidence of acute abdominopelvic pathology on this noncontrast exam. 2.  Partially visualized pneumomediastinum surrounding the distal esophagus.  If there is concern for esophageal perforation, consider further evaluation with a fluoroscopic or CT esophagram.  Prominent tiny tree-in-bud nodular opacities throughout the imaged left lower lobe which may be on the basis of an infectious/inflammatory small airways disease/bronchiolitis or aspiration. Findings and recommendations discussed with GERARD Pinto by Dr. Boo by telephone at 9:45 AM on 5/16/2025.          Presenting Problem/History of Present Illness  Diabetic ketoacidosis without coma  associated with type 1 diabetes mellitus (CMS/[facility]) [E10.10]         Exam on Day of Discharge  Patient seen and examined on day of discharge.      Hospital Course    18 y/o male pmhx DM1, recent diagnosis of celiac disease presented with abdominal pain, nausea and vomiting in the setting of DKA. Patient reported his Dexcom had fallen off couple nights ago and then developed abdominal cramping, nausea and emesis.  Additionally, CT abdomen was done which showed concern for esophageal perforation as imaging showed partially visualized pneumoperitoneum around the esophagus.     He was admitted to the ICU for DKA management with insulin drip and resuscitation.  He was transitioned off insulin drip and placed on SQ insulin coverage.  Thoracic surgery evaluated and barium study was performed and no definite esophageal leak was found. He tolerated regular diet and placed empirically on antibiotic and discharged on Augmentin.         Discharge Disposition  Home   Code Status at Discharge: Prior  Physician Order for Life-Sustaining Treatment Document Status        No documents found

## 2025-05-19 NOTE — ASSESSMENT & PLAN NOTE
Pt with hyperglycemia, anion gap metabolic acidosis, elevated beta hydroxybutyrate, likely DKA     - s/p insulin gtt, transitioned to SQ insulin   - monitor accu check  - endocrine  - thoracic surgery ok to advance diet  - appropriate DVT ppx

## 2025-05-19 NOTE — DISCHARGE INSTRUCTIONS
INSTRUCTIONS:    - complete remaining antibiotic course for pneumonia as prescribed. You have been prescribed Augmentin (amoxicillin/clavulanate).    - your Lantus dosage was adjusted from your prior reported dosage.  Please monitor your blood glucose and follow up with your provider/endocrinologist for management of your diabetes.    - seek medical attention if you develop worsening respiratory status/clinical decline

## 2025-05-19 NOTE — PLAN OF CARE
Care Coordination Discharge Plan Summary    Admission Assessment Summary    General Information  Readmission Within the last 30 days: no previous admission in last 30 days  Does patient have a : No    Living Arrangements  Arrived From: home  Current Living Arrangements: home  People in Home: parent(s), sibling(s)  Home Accessibility: stairs to enter home (Group), stairs within home (Group)  Living Arrangement Comments: lives in [place] w. parents and Siblings. 2 SH, bed/bath on 2nd floor. Currently at the [facility] visiting for his sisters graduation.    Social Drivers of Health - Screenings  Housing Stability  In the last 12 months, was there a time when you were not able to pay the mortgage or rent on time?: No  In the past 12 months, how many times have you moved where you were living?: 0  At any time in the past 12 months, were you homeless or living in a shelter (including now)?: No  Utility Access  In the past 12 months has the electric, gas, oil, or water company threatened to shut off services in your home?: No  Transportation Needs  In the past 12 months, has lack of transportation kept you from medical appointments or from getting medications?: No  In the past 12 months, has lack of transportation kept you from meetings, work, or from getting things needed for daily living?: No    Functional Status Prior to Admission  Assistive Device/Animal Currently Used at Home: glucometer (Dexcom)  Functional Status Comments: indep  IADL Comments: indep    Discharge Needs Assessment    Concerns to be Addressed: no discharge needs identified, denies needs/concerns at this time  Current Discharge Risk: none  Anticipated Changes Related to Illness: none    Discharge Plan Summary    Patient Choice  Offered/Gave Vendor List: no       Concerns / Comments: Patient medically cleared for discharge. No other discharge needs identified.    Discharge Plan:  Disposition/Destination: Home  / Home          Discharge  Transportation:  Is Out of Hospital DNR needed at Discharge: no  Does patient need discharge transport?  no

## 2025-05-19 NOTE — ASSESSMENT & PLAN NOTE
CT abd/pelvis shows pneumomediastinum as well as left lower lobe lung consolidation  Concern for esophageal tear in setting of multiple days of wretching/emesis   ?superimposed Aspiration PNA     - SpO2 goal > 92%  - CXR prn  - thoracic surgery consult  - fluoroscopy barium swallow, no obvious evidence of esophageal leak but redemonstrates of pneumomediastinum and subcutaneous air in neck and soft tissues   - OK to advance diet   - low threshold to get stat CT esophagram with new fever, significant increase in WBC, or other signs of developing infection  - empiric broad spectrum abx coverage.  Transition to Augmentin   - ID

## 2025-05-19 NOTE — ASSESSMENT & PLAN NOTE
As above  I personally discussed with his mom who is planning to take him to the esophageal center at [facility] as an outpatient

## 2025-05-19 NOTE — PROGRESS NOTES
Infectious Disease Progress Note    Patient Name: Levi Joya  MR#: [id number]  : 2005  Admission Date: 2025  Date: 25   Time: 1:27 PM   Author: Jack Meraz MD    OBJECTIVE:  He is feeling much better eating and drinking well    Antibiotics:    Anti-infectives (From admission, onward)      Start     Dose/Rate Route Frequency Ordered Stop    25 1230  cefTRIAXone (ROCEPHIN) IVPB 1 g in 100 mL NSS vial in bag         1 g  200 mL/hr over 30 Minutes intravenous Every 24 hours interval 25 1136      25 1230  metroNIDAZOLE in NaCl (iso-os) (FLAGYL) IVPB 500 mg         500 mg  100 mL/hr over 60 Minutes intravenous Every 8 hours interval 25 1136      25 1230  fluconazole in NaCl (iso-osm) (DIFLUCAN) IVPB 200 mg         200 mg  100 mL/hr over 60 Minutes intravenous Every 24 hours interval 25 1136              ROS  As above  Vital Signs:    Temp:  [36.6 °C (97.8 °F)-37.1 °C (98.7 °F)] 36.6 °C (97.8 °F)  Heart Rate:  [47-92] 67  Resp:  [12-20] 16  BP: (108-148)/(64-98) 122/79    Temp (72hrs), Av.9 °C (98.4 °F), Min:36.5 °C (97.7 °F), Max:37.3 °C (99.2 °F)      Physical Exam    Gen: Aox3  HEENT: OP clear  Neck: Supple  LAD: No cervical LAD  Lungs: CTAB  CV: RRR no murmurs  Abd: Soft NTND +BS  Ext: No c/c/e  Skin: no rash  Neuro: II-XII intact        Lines, Drains, Airways, Wounds:       Labs:    Lab Results   Component Value Date    WBC 4.61 2025    HGB 13.5 (L) 2025    HCT 38.7 (L) 2025    MCV 86.2 2025     2025     Lab Results   Component Value Date    GLUCOSE 170 (H) 2025    CALCIUM 9.1 2025     2025    K 3.7 2025    CO2 25 2025     2025    BUN 7 2025    CREATININE 0.7 2025     Lab Results   Component Value Date    ALT 19 2025    AST 16 2025    ALKPHOS 106 2025    BILITOT 0.5 2025         Patient Active Problem List   Diagnosis Code     Diabetic ketoacidosis without coma associated with type 1 diabetes mellitus (CMS/HCC) E10.10    Hyponatremia E87.1    Metabolic acidosis E87.20    Leukocytosis D72.829    Aspiration pneumonia (CMS/HCC) J69.0    Pneumomediastinum (CMS/HCC) J98.2     Assessment & Plan  Aspiration pneumonia (CMS/HCC)  The plan is to complete the 10 days of treatment with Augmentin 875 twice daily  Pneumomediastinum (CMS/[facility])  As above  I personally discussed with his mom who is planning to take him to the esophageal center at [facility] as an outpatient  Diabetic ketoacidosis without coma associated with type 1 diabetes mellitus (CMS/HCC)  This is being managed by the endocrine team    I will sign off      Jack Meraz MD  5/19/20251:27 PM

## 2025-05-19 NOTE — PROGRESS NOTES
Critical Care Progress Note    SUBJECTIVE    Patient transitioned to SQ insulin. BG covered  He will need f/u with his endocrinologist for diabetes management   He is eating w/o difficulty   No signs of acute respiratory distress, oxygenating well on room air  To d/c home today      OBJECTIVE    Vitals:    05/19/25 1000   BP: 135/84   Pulse: 67   Resp: 16   Temp:    SpO2: 96%         Intake/Output Summary (Last 24 hours) at 5/19/2025 1056  Last data filed at 5/19/2025 0509  Gross per 24 hour   Intake 2203.41 ml   Output 901 ml   Net 1302.41 ml            Lab Results   Component Value Date    GLUCOSE 170 (H) 05/19/2025    CALCIUM 9.1 05/19/2025     05/19/2025    K 3.7 05/19/2025    CO2 25 05/19/2025     05/19/2025    BUN 7 05/19/2025    CREATININE 0.7 05/19/2025       Lab Results   Component Value Date    WBC 4.61 05/19/2025    HGB 13.5 (L) 05/19/2025    HCT 38.7 (L) 05/19/2025    MCV 86.2 05/19/2025     05/19/2025       Lab Results   Component Value Date    MG 1.6 (L) 05/19/2025    PHOS 4.0 05/19/2025         Physical Exam:  GEN: NAD  HEENT: AT/NC  Cv: RRR,  Lungs: aerating w/o wheeze/rhonchi  ABD: Soft, normoactive, NTP  EXT: symmetric  Pv: pulses intact  Neuro: Ao3, nonfocal  Skin: warm, dry    Labs  I have reviewed the patient's pertinent labs.     Imaging  I have independently reviewed the patient's pertinent imaging.     Assessment & Plan  Diabetic ketoacidosis without coma associated with type 1 diabetes mellitus (CMS/HCC)  Pt with hyperglycemia, anion gap metabolic acidosis, elevated beta hydroxybutyrate, likely DKA     - s/p insulin gtt, transitioned to SQ insulin   - monitor accu check  - endocrine  - thoracic surgery ok to advance diet  - appropriate DVT ppx   Hyponatremia  Likely 2nd to hypovolemia, hyperglycemia  Resolved     - monitor as needed  Metabolic acidosis  Likely 2nd to dka, also elevated lactate  Improving       - chemistries per DKA protocol    Leukocytosis  Suspect  reactive 2nd to DKA but will check CT A/P to rule out acute intra abdominal condition/process  Pneumomediastinum (CMS/HCC)  Aspiration pneumonia (CMS/HCC)  CT abd/pelvis shows pneumomediastinum as well as left lower lobe lung consolidation  Concern for esophageal tear in setting of multiple days of wretching/emesis   ?superimposed Aspiration PNA     - SpO2 goal > 92%  - CXR prn  - thoracic surgery consult  - fluoroscopy barium swallow, no obvious evidence of esophageal leak but redemonstrates of pneumomediastinum and subcutaneous air in neck and soft tissues   - OK to advance diet   - low threshold to get stat CT esophagram with new fever, significant increase in WBC, or other signs of developing infection  - empiric broad spectrum abx coverage.  Transition to Augmentin   - ID     GERARD Adames Scribed for and in presence of DR Stanford Holcomb  Insert

## 2025-05-19 NOTE — PLAN OF CARE
Plan of Care Review  Plan of Care Reviewed With: patient  Progress: improving  Outcome Evaluation: Pt advanced to carb control diet, insulin gtt is off. Pt tolerating PO food and liquids. Blood sugars controlled via dexcom. Pt needs reeducation and reinforcement of insulin/diabetes education.

## 2025-05-19 NOTE — PROGRESS NOTES
Endocrine Daily Progress Note    SUBJECTIVE:    Sugars well-controlled off insulin drip. Tolerated breakfast without issue. Does admit to skipping pre-boluses at times. Follows with endocrinology at [facility].    OBJECTIVE:   Vitals:    05/19/25 1000   BP: 135/84   Pulse: 67   Resp: 16   Temp:    SpO2: 96%     Physical Exam  Gen: well nourished, no acute distress  Eyes:  normal sclera, no proptosis  HENT:  normocephalic, normal tongue, MMM  Neck:  no thyromegaly  CV:  no edema bilaterally  Pulm:  no use of accessory muscles  Neuro:  CN 2-12 grossly intact  Skin:  no rashes, warm and dry   Psych: normal mood, affect    Labs  Lab Results   Component Value Date    GLUCOSE 170 (H) 05/19/2025    CALCIUM 9.1 05/19/2025     05/19/2025    K 3.7 05/19/2025    CO2 25 05/19/2025     05/19/2025    BUN 7 05/19/2025    CREATININE 0.7 05/19/2025     Lab Results   Component Value Date    ALT 19 05/16/2025    AST 16 05/16/2025    ALKPHOS 106 05/16/2025    BILITOT 0.5 05/16/2025     Lab Results   Component Value Date    WBC 4.61 05/19/2025    HGB 13.5 (L) 05/19/2025    HCT 38.7 (L) 05/19/2025    MCV 86.2 05/19/2025     05/19/2025     Lab Results   Component Value Date    HGBA1C 8.3 (H) 05/16/2025     Blood Glucose results   Results from last 7 days   Lab Units 05/18/25  2018 05/18/25  1917 05/18/25  1803 05/18/25  1700 05/18/25  1612 05/18/25  1511 05/18/25  1402   POCT GLUCOSE mg/dL 101* 104* 113* 139* 175* 181* 186*        Assessment & Plan  Diabetic ketoacidosis without coma associated with type 1 diabetes mellitus (CMS/HCC)  DKA triggered in setting of stress during college finals, poor sleep, insulin underdosing, now resolved s/p insulin drip.    For discharge, agree with Lantus 8 units, lispro 3 units with meals. Please prescribe insulin pens + pen needles. Can also prescribe Dexcom G7 apply new sensor every 10 days, disp 3.    Recommendations for new endocrinologist in [place] provided. He will  also discuss insulin pumps at his next outpatient visit.      Urszula Goldman,    Endocrinology, Diabetes and Metabolism  [facility]  Epic Chat preferred  Office: [phone]

## 2025-05-19 NOTE — ASSESSMENT & PLAN NOTE
DKA triggered in setting of stress during college finals, poor sleep, insulin underdosing, now resolved s/p insulin drip.    For discharge, agree with Lantus 8 units, lispro 3 units with meals. Please prescribe insulin pens + pen needles. Can also prescribe Dexcom G7 apply new sensor every 10 days, disp 3.    Recommendations for new endocrinologist in [place] provided. He will also discuss insulin pumps at his next outpatient visit.      Urszula Goldman,    Endocrinology, Diabetes and Metabolism  [facility]  Epic Chat preferred  Office: [phone]

## 2025-05-22 ENCOUNTER — APPOINTMENT (OUTPATIENT)
Dept: ENDOCRINOLOGY | Facility: CLINIC | Age: 20
End: 2025-05-22
Payer: COMMERCIAL

## 2025-05-22 ENCOUNTER — NON-APPOINTMENT (OUTPATIENT)
Age: 20
End: 2025-05-22

## 2025-05-22 VITALS
HEART RATE: 72 BPM | HEIGHT: 71 IN | BODY MASS INDEX: 22.54 KG/M2 | OXYGEN SATURATION: 97 % | SYSTOLIC BLOOD PRESSURE: 110 MMHG | WEIGHT: 161 LBS | DIASTOLIC BLOOD PRESSURE: 70 MMHG

## 2025-05-22 DIAGNOSIS — K90.0 CELIAC DISEASE: ICD-10-CM

## 2025-05-22 DIAGNOSIS — E10.9 TYPE 1 DIABETES MELLITUS W/OUT COMPLICATIONS: ICD-10-CM

## 2025-05-22 LAB
GLUCOSE BLDC GLUCOMTR-MCNC: 113
HBA1C MFR BLD HPLC: 8.1

## 2025-05-22 PROCEDURE — 82962 GLUCOSE BLOOD TEST: CPT

## 2025-05-22 PROCEDURE — G2211 COMPLEX E/M VISIT ADD ON: CPT | Mod: NC

## 2025-05-22 PROCEDURE — 99214 OFFICE O/P EST MOD 30 MIN: CPT

## 2025-05-22 PROCEDURE — 83036 HEMOGLOBIN GLYCOSYLATED A1C: CPT | Mod: QW

## 2025-05-27 RX ORDER — INSULIN DEGLUDEC INJECTION 100 U/ML
100 INJECTION, SOLUTION SUBCUTANEOUS
Qty: 1 | Refills: 3 | Status: ACTIVE | COMMUNITY
Start: 2025-05-27 | End: 1900-01-01

## 2025-06-05 ENCOUNTER — APPOINTMENT (OUTPATIENT)
Dept: ENDOCRINOLOGY | Facility: CLINIC | Age: 20
End: 2025-06-05

## 2025-06-17 LAB
GLUCOSE BLD-MCNC: 192 MG/DL (ref 70–99)
POCT TEST: ABNORMAL

## 2025-06-18 ENCOUNTER — APPOINTMENT (OUTPATIENT)
Dept: ENDOCRINOLOGY | Facility: CLINIC | Age: 20
End: 2025-06-18

## 2025-08-05 ENCOUNTER — APPOINTMENT (OUTPATIENT)
Dept: ENDOCRINOLOGY | Facility: CLINIC | Age: 20
End: 2025-08-05